# Patient Record
Sex: MALE | Race: BLACK OR AFRICAN AMERICAN | NOT HISPANIC OR LATINO | Employment: OTHER | URBAN - METROPOLITAN AREA
[De-identification: names, ages, dates, MRNs, and addresses within clinical notes are randomized per-mention and may not be internally consistent; named-entity substitution may affect disease eponyms.]

---

## 2018-11-20 ENCOUNTER — TELEPHONE (OUTPATIENT)
Dept: SPINE | Facility: CLINIC | Age: 78
End: 2018-11-20

## 2018-11-20 NOTE — TELEPHONE ENCOUNTER
Spoke with patient.  Had fusion of his neck done in June of this year by Dr Jackson.  Patient c/o neck pain that radiates down his left arm.  Is this ok to schedule with Dr Joe?

## 2018-11-20 NOTE — TELEPHONE ENCOUNTER
----- Message from Refugio Alfaro sent at 11/19/2018  4:19 PM CST -----  Contact: Patient @ [phone]  Patient is calling to schedule the NP appt, pt is being referred by Dr Jackson, pls call to advise

## 2018-11-21 NOTE — TELEPHONE ENCOUNTER
Spoke with patient.  He was referred to see with Dr Salas of [place] for evaluation.  He was scheduled to see Dr Handy, but appt was canceled by Deysi Erwin.  Patient was informed by Deysi Erwin to see Dr Joe.  Patient has follow up with his surgeon Dr Jackson a month ago.  Please advise.

## 2018-11-27 ENCOUNTER — TELEPHONE (OUTPATIENT)
Dept: SPINE | Facility: CLINIC | Age: 78
End: 2018-11-27

## 2018-11-27 NOTE — TELEPHONE ENCOUNTER
----- Message from Shirley Sanchez RN sent at 11/27/2018  2:45 PM CST -----  Please call and advise his appt has been rescheduled to 1/29/2019 at 130 pm as Dr. Joe will not be available on 12/18.

## 2018-11-27 NOTE — TELEPHONE ENCOUNTER
Message left on voicemail and informed patient of new appointment date and time.  Also mailed confirmation to patient.

## 2018-12-21 ENCOUNTER — TELEPHONE (OUTPATIENT)
Dept: SPINE | Facility: CLINIC | Age: 78
End: 2018-12-21

## 2018-12-21 NOTE — TELEPHONE ENCOUNTER
----- Message from Shirley Sanchez RN sent at 12/21/2018  9:41 AM CST -----  Please call patient again regarding rescheduled appt[place]

## 2019-01-16 ENCOUNTER — TELEPHONE (OUTPATIENT)
Dept: SPINE | Facility: CLINIC | Age: 79
End: 2019-01-16

## 2019-01-16 NOTE — TELEPHONE ENCOUNTER
----- Message from Shirley Sanchez RN sent at 1/16/2019 10:27 AM CST -----  Please advise he has a new appt on 2/7

## 2019-02-07 ENCOUNTER — OFFICE VISIT (OUTPATIENT)
Dept: SPINE | Facility: CLINIC | Age: 79
End: 2019-02-07
Attending: NEUROLOGICAL SURGERY
Payer: MEDICARE

## 2019-02-07 VITALS
BODY MASS INDEX: 22.33 KG/M2 | HEART RATE: 80 BPM | TEMPERATURE: 99 F | HEIGHT: 74 IN | WEIGHT: 174 LBS | DIASTOLIC BLOOD PRESSURE: 76 MMHG | SYSTOLIC BLOOD PRESSURE: 112 MMHG

## 2019-02-07 DIAGNOSIS — G52.8 SPINAL ACCESSORY NERVE DISORDER: ICD-10-CM

## 2019-02-07 DIAGNOSIS — M95.8 WINGING OF SCAPULA: Primary | ICD-10-CM

## 2019-02-07 PROCEDURE — 99999 PR PBB SHADOW E&M-EST. PATIENT-LVL III: ICD-10-PCS | Mod: PBBFAC,,, | Performed by: NEUROLOGICAL SURGERY

## 2019-02-07 PROCEDURE — 99999 PR PBB SHADOW E&M-EST. PATIENT-LVL III: CPT | Mod: PBBFAC,,, | Performed by: NEUROLOGICAL SURGERY

## 2019-02-07 PROCEDURE — 99213 OFFICE O/P EST LOW 20 MIN: CPT | Mod: PBBFAC | Performed by: NEUROLOGICAL SURGERY

## 2019-02-07 PROCEDURE — 99204 PR OFFICE/OUTPT VISIT, NEW, LEVL IV, 45-59 MIN: ICD-10-PCS | Mod: S$PBB,,, | Performed by: NEUROLOGICAL SURGERY

## 2019-02-07 PROCEDURE — 99204 OFFICE O/P NEW MOD 45 MIN: CPT | Mod: S$PBB,,, | Performed by: NEUROLOGICAL SURGERY

## 2019-02-07 RX ORDER — HYDROCODONE BITARTRATE AND ACETAMINOPHEN 5; 325 MG/1; MG/1
1 TABLET ORAL
Refills: 0 | COMMUNITY
Start: 2018-12-29 | End: 2023-03-23 | Stop reason: ALTCHOICE

## 2019-02-07 RX ORDER — GABAPENTIN 300 MG/1
300 CAPSULE ORAL DAILY
Refills: 5 | COMMUNITY
End: 2024-01-05 | Stop reason: DRUGHIGH

## 2019-02-07 NOTE — LETTER
February 7, 2019      Suha Jackson MD  [address] [place] [id number]           [facility]  [address], [address]  [facility] [address]  Phone: [phone]  Fax: [phone]          Patient: Hernan Echavarria   MR Number: [id number]   YOB: 1940   Date of Visit: 2/7/2019       Dear Dr. Suha Jackson:    Thank you for referring Hernan Echavarria to me for evaluation. Attached you will find relevant portions of my assessment and plan of care.    If you have questions, please do not hesitate to call me. I look forward to following Hernan Echavarria along with you.    Sincerely,    Matthew Joe MD    Enclosure  CC:  No Recipients    If you would like to receive this communication electronically, please contact [email or web address] or [phone] to request more information on [facility] access.    For providers and/or their staff who would like to refer a patient to [facility], please contact us through our one-stop-shop provider referral line, [facility], at [phone].    If you feel you have received this communication in error or would no longer like to receive these types of communications, please e-mail [email or web address]

## 2019-02-07 NOTE — PROGRESS NOTES
CHIEF COMPLAINT:    Left winging scapula s/p ACDF    HPI:  Hernan Echavarria is a 78 y.o. male who presents for neurosurgical evaluation for nerve injury.  He is s/p ACDF C3-4 and C6-7 that was performed by Dr Suha Jackson which went well but he woke up with left winging scapula and droopy left shoulder. He also has pain that radiate towards the occipital region along the superior half of SCM. He denies any serious neck or radicular arm pain.  He  denies clumsiness, denies incoordination, and denies changes in fine motor skills in the hands/fingers.        (Not in a hospital admission)    Review of patient's allergies indicates:   Allergen Reactions    Hydrocodone Itching       Past Medical History:   Diagnosis Date    Anticoagulant long-term use     Arthritis     Atrial flutter     Cervical disc disease     Coronary artery disease     GERD (gastroesophageal reflux disease)     Heartburn     Hyperlipidemia     Hypertension     Osteoarthritis     S/P AVR (aortic valve replacement) 10/15/2003    CARBOMEDICS R500 &F700    Valvular disease      Past Surgical History:   Procedure Laterality Date    BACK SURGERY      lower back    CARDIAC SURGERY      MECHANICAL HEART VALVE    CARPAL TUNNEL RELEASE      DISKECTOMY AND FUSION-ANTERIOR CERVICAL (ACDF) C4-5 C5-6 N/A 2015    [facility]    EYE SURGERY      CATARACT LENS IMPLANTS    HERNIA REPAIR       Family History   Problem Relation Age of Onset    Hypertension Other     Heart disease Other     Peripheral vascular disease Mother      Social History     Tobacco Use    Smoking status: Former Smoker     Last attempt to quit: 1965     Years since quittin.2    Smokeless tobacco: Never Used   Substance Use Topics    Alcohol use: No    Drug use: No        Review of Systems:  Review of Systems    OBJECTIVE:     Vital Signs (Most Recent)  Temp: 98.5 °F (36.9 °C) (19 1229)  Pulse: 80 (19 1229)  BP:  112/76 (02/07/19 1229)    Physical Exam:    Neurosurgery Physical Exam        Upper Extremity  supraspin infraspin Lat. Dorsi Deltoid Biceps Triceps Brachio  rad FCR FCU Wrist Ext    R 5/5 5/5 5/5 5/5 5/5 5/5 5/5 5/5 5/5 5/5    L 5/5 5/5 5/5 5/5 5/5 5/5 5/5 5/5 5/5 5/5     Abd. Poll. Brevis Palmar Interossei Dorsal Interossei lumbricals Opp. Policis Opponens Dig. Min Finger Extensors       R 5/5 5/5 5/5 5/5 5/5 5/5 5/5       L 5/5 5/5 5/5 5/5 5/5 5/5 5/5          Lower Extremity  Hip Adductor Hip Abductor Hip extension Hip Flexion Quadriceps  (Knee ext) Hamstrings  (Knee Flexion)  Ankle Dorsiflexion Ankle Eversion Ankle Inversion Ankle Plantar flexion Toe  Flexion Toe   Extension    R 5/5 5/5 5/5 5/5 5/5 5/5 5/5 5/5 5/5 5/5 5/5 5/5    L 5/5 5/5 5/5 5/5 5/5 5/5 5/5 5/5 5/5 5/5 5/5 5/5         Dermatome  Axillary Radial Median  Forearm Median  Carpal Tunnel Ulnar-Cubital tunnel Ulnar- Guyon's  canal MAC of the forearm LAC of  the forerm   Upper: R Normal Normal Normal Normal Normal Normal Normal Normal    L Normal Normal Normal Normal Normal Normal Normal Normal     Dermatome  LFCN Obturator  PCNT Peroneal Sural Lat Plantar Nerve Med Plantar Nerve Saphenous Nerve Calcaneal Nerve   Lower: R Normal Normal Normal Normal Normal Normal Normal Normal Normal    L Normal Normal Normal Normal Normal Normal Normal Normal Normal     Left trapezium is 4/5    Laboratory  EMG- left spinal accessory neuropathy, other entrapments involing the median nerve in the wrist    Diagnostic Results:  C-spine: C3-4 and C6-7 ACDF, previous fusion at C5-6 with good placement of hardware    ASSESSMENT/PLAN:     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 No surgery anticipated.

## 2023-01-17 PROBLEM — Z79.01 LONG TERM (CURRENT) USE OF ANTICOAGULANTS: Status: ACTIVE | Noted: 2023-01-17

## 2023-03-23 ENCOUNTER — TELEPHONE (OUTPATIENT)
Dept: GASTROENTEROLOGY | Facility: CLINIC | Age: 83
End: 2023-03-23
Payer: MEDICARE

## 2023-03-23 NOTE — TELEPHONE ENCOUNTER
----- Message from Sally Rai sent at 3/23/2023  9:20 AM CDT -----  Regarding: Sooner appt if possible  Pt is looking to schedule sooner appt if possible.    Currently scheduled for 4/21 @ 1:30 and on the wait list.    Please contact pt to discuss.

## 2023-03-27 PROBLEM — N18.32 STAGE 3B CHRONIC KIDNEY DISEASE: Status: ACTIVE | Noted: 2023-03-27

## 2023-05-04 PROBLEM — K59.01 SLOW TRANSIT CONSTIPATION: Status: ACTIVE | Noted: 2023-05-04

## 2023-05-04 PROBLEM — K21.9 GASTROESOPHAGEAL REFLUX DISEASE: Status: ACTIVE | Noted: 2023-05-04

## 2023-05-04 PROBLEM — K31.84 GASTROPARESIS: Status: ACTIVE | Noted: 2023-05-04

## 2023-07-05 ENCOUNTER — TELEPHONE (OUTPATIENT)
Dept: NEPHROLOGY | Facility: CLINIC | Age: 83
End: 2023-07-05
Payer: MEDICARE

## 2023-07-05 NOTE — TELEPHONE ENCOUNTER
Spoke to pt. He is aware of appt. Date and time and no labs needed at this time as we have not seen pt. In our clinic,  he is a new pt.

## 2023-07-12 PROBLEM — G56.00: Status: ACTIVE | Noted: 2019-09-15

## 2023-07-12 PROBLEM — D64.9 ANEMIA: Status: ACTIVE | Noted: 2019-09-15

## 2023-07-12 PROBLEM — E78.5 HYPERLIPIDEMIA: Status: ACTIVE | Noted: 2019-09-16

## 2023-07-12 PROBLEM — N52.9 IMPOTENCE: Status: ACTIVE | Noted: 2019-09-15

## 2023-07-12 PROBLEM — S06.9XAA BRAIN INJURY: Status: ACTIVE | Noted: 2018-09-10

## 2023-07-12 PROBLEM — G62.9 NEUROPATHY: Status: ACTIVE | Noted: 2021-05-04

## 2023-07-12 PROBLEM — I63.9 CEREBRAL INFARCTION: Status: ACTIVE | Noted: 2018-09-10

## 2023-07-12 PROBLEM — N18.9 CHRONIC KIDNEY DISEASE: Status: ACTIVE | Noted: 2017-09-05

## 2023-07-12 PROBLEM — E21.3 HYPERPARATHYROIDISM: Status: ACTIVE | Noted: 2023-07-12

## 2023-07-12 PROBLEM — K22.70 BARRETT'S ESOPHAGUS: Status: ACTIVE | Noted: 2019-09-15

## 2023-07-12 PROBLEM — I77.810 THORACIC AORTIC ECTASIA: Status: ACTIVE | Noted: 2019-09-15

## 2023-07-12 PROBLEM — I50.42 CHRONIC COMBINED SYSTOLIC AND DIASTOLIC HEART FAILURE: Status: ACTIVE | Noted: 2023-03-20

## 2023-08-02 ENCOUNTER — OFFICE VISIT (OUTPATIENT)
Dept: NEPHROLOGY | Facility: CLINIC | Age: 83
End: 2023-08-02
Payer: MEDICARE

## 2023-08-02 VITALS
HEART RATE: 71 BPM | WEIGHT: 171 LBS | HEIGHT: 74 IN | DIASTOLIC BLOOD PRESSURE: 72 MMHG | SYSTOLIC BLOOD PRESSURE: 122 MMHG | OXYGEN SATURATION: 99 % | BODY MASS INDEX: 21.94 KG/M2

## 2023-08-02 DIAGNOSIS — N18.32 CHRONIC KIDNEY DISEASE, STAGE 3B: Primary | ICD-10-CM

## 2023-08-02 PROCEDURE — 99999 PR PBB SHADOW E&M-EST. PATIENT-LVL IV: ICD-10-PCS | Mod: PBBFAC,,, | Performed by: INTERNAL MEDICINE

## 2023-08-02 PROCEDURE — 3074F PR MOST RECENT SYSTOLIC BLOOD PRESSURE < 130 MM HG: ICD-10-PCS | Mod: CPTII,S$GLB,, | Performed by: INTERNAL MEDICINE

## 2023-08-02 PROCEDURE — 99204 OFFICE O/P NEW MOD 45 MIN: CPT | Mod: S$GLB,,, | Performed by: INTERNAL MEDICINE

## 2023-08-02 PROCEDURE — 1159F MED LIST DOCD IN RCRD: CPT | Mod: CPTII,S$GLB,, | Performed by: INTERNAL MEDICINE

## 2023-08-02 PROCEDURE — 1101F PR PT FALLS ASSESS DOC 0-1 FALLS W/OUT INJ PAST YR: ICD-10-PCS | Mod: CPTII,S$GLB,, | Performed by: INTERNAL MEDICINE

## 2023-08-02 PROCEDURE — 1101F PT FALLS ASSESS-DOCD LE1/YR: CPT | Mod: CPTII,S$GLB,, | Performed by: INTERNAL MEDICINE

## 2023-08-02 PROCEDURE — 1160F PR REVIEW ALL MEDS BY PRESCRIBER/CLIN PHARMACIST DOCUMENTED: ICD-10-PCS | Mod: CPTII,S$GLB,, | Performed by: INTERNAL MEDICINE

## 2023-08-02 PROCEDURE — 3288F FALL RISK ASSESSMENT DOCD: CPT | Mod: CPTII,S$GLB,, | Performed by: INTERNAL MEDICINE

## 2023-08-02 PROCEDURE — 3074F SYST BP LT 130 MM HG: CPT | Mod: CPTII,S$GLB,, | Performed by: INTERNAL MEDICINE

## 2023-08-02 PROCEDURE — 3078F DIAST BP <80 MM HG: CPT | Mod: CPTII,S$GLB,, | Performed by: INTERNAL MEDICINE

## 2023-08-02 PROCEDURE — 99999 PR PBB SHADOW E&M-EST. PATIENT-LVL IV: CPT | Mod: PBBFAC,,, | Performed by: INTERNAL MEDICINE

## 2023-08-02 PROCEDURE — 3288F PR FALLS RISK ASSESSMENT DOCUMENTED: ICD-10-PCS | Mod: CPTII,S$GLB,, | Performed by: INTERNAL MEDICINE

## 2023-08-02 PROCEDURE — 1160F RVW MEDS BY RX/DR IN RCRD: CPT | Mod: CPTII,S$GLB,, | Performed by: INTERNAL MEDICINE

## 2023-08-02 PROCEDURE — 3078F PR MOST RECENT DIASTOLIC BLOOD PRESSURE < 80 MM HG: ICD-10-PCS | Mod: CPTII,S$GLB,, | Performed by: INTERNAL MEDICINE

## 2023-08-02 PROCEDURE — 1159F PR MEDICATION LIST DOCUMENTED IN MEDICAL RECORD: ICD-10-PCS | Mod: CPTII,S$GLB,, | Performed by: INTERNAL MEDICINE

## 2023-08-02 PROCEDURE — 99204 PR OFFICE/OUTPT VISIT, NEW, LEVL IV, 45-59 MIN: ICD-10-PCS | Mod: S$GLB,,, | Performed by: INTERNAL MEDICINE

## 2023-08-02 NOTE — PROGRESS NOTES
Subjective:       Patient ID: Hernan Echavarria is a 82 y.o. Black or  male who presents for new patient evaluation for chronic renal failure.    Hernan Echavarria is referred by Lesley Hernandez DO to be evaluated for chronic renal failure.  He has been seeing another kidney doctor but comes in today for a second opinion.  He reports he has itchy skin chronically.  He also reports he was started on farxiga but does not want to continue it due to cost.  He has been having abdominal bloating for the past 4-5 months and was seen by GI but he comes in today asking for a second opinion regarding this as well.  He denies frequent NSAIDs.  He had a recent CT scan which did not show any masses but did show a prominent stool pattern.      Review of Systems   Constitutional:  Negative for appetite change, chills and fever.   HENT:  Negative for congestion.    Eyes:  Negative for visual disturbance.   Respiratory:  Negative for cough and shortness of breath.    Cardiovascular:  Negative for chest pain and leg swelling.   Gastrointestinal:  Positive for abdominal distention. Negative for abdominal pain, diarrhea, nausea and vomiting.   Genitourinary:  Negative for difficulty urinating, dysuria and hematuria.   Musculoskeletal:  Negative for myalgias.   Skin:  Negative for rash.        itchy   Neurological:  Negative for headaches.   Psychiatric/Behavioral:  Negative for sleep disturbance.          The past medical, family and social histories were reviewed for this encounter.     Past Medical History:   Diagnosis Date    Anticoagulant long-term use     Arthritis     Atrial flutter     Cervical disc disease     Coronary artery disease     GERD (gastroesophageal reflux disease)     Heartburn     Hyperlipidemia     Hypertension     Osteoarthritis     S/P AVR (aortic valve replacement) 10/15/2003    CARBOMEDICS R500 &F700    Valvular disease      Past Surgical History:   Procedure Laterality Date    BACK SURGERY       lower back    CARDIAC SURGERY      MECHANICAL HEART VALVE    CARPAL TUNNEL RELEASE      EYE SURGERY      CATARACT LENS IMPLANTS    HERNIA REPAIR       Social History     Socioeconomic History    Marital status:    Tobacco Use    Smoking status: Former     Current packs/day: 0.00     Types: Cigarettes     Quit date: 1965     Years since quittin.7    Smokeless tobacco: Never   Substance and Sexual Activity    Alcohol use: No    Drug use: No     Current Outpatient Medications   Medication Sig    aspirin 81 MG Chew Take 81 mg by mouth once daily.    atenolol (TENORMIN) 50 MG tablet Take 50 mg by mouth every morning.    Bifidobacterium infantis (ALIGN) 4 mg Cap Take by mouth.    diclofenac sodium (VOLTAREN) 1 % Gel Apply 2 g topically 3 (three) times daily. for 14 days    docusate sodium (COLACE) 100 MG capsule Take 1 capsule (100 mg total) by mouth 2 (two) times daily as needed for Constipation.    famotidine (PEPCID) 20 MG tablet Take 1 tablet (20 mg total) by mouth 2 (two) times daily.    gabapentin (NEURONTIN) 300 MG capsule Take 300 mg by mouth once daily.    hydrOXYzine HCL (ATARAX) 25 MG tablet Take 1 tablet by mouth nightly as needed for Itching.    levocetirizine (XYZAL) 5 MG tablet Take 1 tablet (5 mg total) by mouth every evening.    metoclopramide HCl (REGLAN) 10 MG tablet Take 1 tablet (10 mg total) by mouth 3 (three) times daily as needed (abdominal bloating/cramps). Abdominal bloating / cramps    mupirocin (BACTROBAN) 2 % ointment Apply topically 3 (three) times daily.    pantoprazole (PROTONIX) 40 MG tablet Take 40 mg by mouth once daily.    rosuvastatin (CRESTOR) 10 MG tablet Take 10 mg by mouth every evening.    tamsulosin (FLOMAX) 0.4 mg Cap Take 1 capsule by mouth Daily.    warfarin (COUMADIN) 5 MG tablet Take 2.5-5 mg by mouth once daily. Take 1/2-1 pill by mouth daily or as directed per the [facility]; Current dose per patient (23): Warfarin 5mg daily, except 2.5mg on  "Tuesdays and Thursdays     No current facility-administered medications for this visit.       /72 (BP Location: Left arm, Patient Position: Sitting)   Pulse 71   Ht 6' 2" (1.88 m)   Wt 77.6 kg (171 lb)   SpO2 99%   BMI 21.96 kg/m²     Objective:      Physical Exam  Vitals reviewed.   Constitutional:       General: He is not in acute distress.     Appearance: He is well-developed.   HENT:      Head: Normocephalic and atraumatic.   Eyes:      General: No scleral icterus.     Conjunctiva/sclera: Conjunctivae normal.   Neck:      Vascular: No JVD.   Cardiovascular:      Rate and Rhythm: Normal rate and regular rhythm.      Heart sounds: Normal heart sounds. No murmur heard.     No friction rub. No gallop.   Pulmonary:      Effort: Pulmonary effort is normal. No respiratory distress.      Breath sounds: Normal breath sounds. No wheezing.   Abdominal:      General: Bowel sounds are normal. There is no distension.      Palpations: Abdomen is soft.      Tenderness: There is no abdominal tenderness.   Musculoskeletal:      Cervical back: Normal range of motion.      Right lower leg: No edema.      Left lower leg: No edema.   Skin:     General: Skin is warm and dry.      Findings: No rash.   Neurological:      Mental Status: He is alert and oriented to person, place, and time.   Psychiatric:         Mood and Affect: Mood normal.         Behavior: Behavior normal.         Assessment:       1. Chronic kidney disease, stage 3b        Plan:   Return to clinic in 4 months.  Labs for next visit include rp pth upc per standing orders.  Baseline creatinine is 1.4-1.7 since 2015.  Renal US shows R 10.4 cm L 10.8 cm.  Refer to GI per his request.      "

## 2023-08-04 ENCOUNTER — TELEPHONE (OUTPATIENT)
Dept: NEPHROLOGY | Facility: CLINIC | Age: 83
End: 2023-08-04
Payer: MEDICARE

## 2023-08-04 NOTE — TELEPHONE ENCOUNTER
----- Message from Monica Espinoza sent at 8/4/2023 12:48 PM CDT -----  Contact: call  Type:  Needs Medical Advice    Who Called: patient     Would the patient rather a call back or a response via [facility]? Call     Best Call Back Number: [phone] (home)      Additional Information: Patient would like to speak with the nurse in regards to discussing something.     Please call to advise

## 2023-08-08 ENCOUNTER — TELEPHONE (OUTPATIENT)
Dept: GASTROENTEROLOGY | Facility: CLINIC | Age: 83
End: 2023-08-08

## 2023-08-08 ENCOUNTER — OFFICE VISIT (OUTPATIENT)
Dept: GASTROENTEROLOGY | Facility: CLINIC | Age: 83
End: 2023-08-08
Payer: MEDICARE

## 2023-08-08 ENCOUNTER — TREATMENT PLANNING (OUTPATIENT)
Dept: GASTROENTEROLOGY | Facility: CLINIC | Age: 83
End: 2023-08-08

## 2023-08-08 VITALS — BODY MASS INDEX: 22.75 KG/M2 | WEIGHT: 177.25 LBS | HEIGHT: 74 IN

## 2023-08-08 DIAGNOSIS — R14.0 ABDOMINAL BLOATING: Primary | ICD-10-CM

## 2023-08-08 DIAGNOSIS — Z87.19 HISTORY OF GASTROESOPHAGEAL REFLUX (GERD): ICD-10-CM

## 2023-08-08 DIAGNOSIS — K31.84 GASTROPARESIS: ICD-10-CM

## 2023-08-08 DIAGNOSIS — K59.04 CHRONIC IDIOPATHIC CONSTIPATION: ICD-10-CM

## 2023-08-08 DIAGNOSIS — Z79.01 CURRENT USE OF ANTICOAGULANT THERAPY: ICD-10-CM

## 2023-08-08 DIAGNOSIS — R10.84 GENERALIZED ABDOMINAL PAIN: ICD-10-CM

## 2023-08-08 PROCEDURE — 99214 PR OFFICE/OUTPT VISIT, EST, LEVL IV, 30-39 MIN: ICD-10-PCS | Mod: S$GLB,,, | Performed by: NURSE PRACTITIONER

## 2023-08-08 PROCEDURE — 1160F RVW MEDS BY RX/DR IN RCRD: CPT | Mod: CPTII,S$GLB,, | Performed by: NURSE PRACTITIONER

## 2023-08-08 PROCEDURE — 99999 PR PBB SHADOW E&M-EST. PATIENT-LVL IV: CPT | Mod: PBBFAC,,, | Performed by: NURSE PRACTITIONER

## 2023-08-08 PROCEDURE — 1159F MED LIST DOCD IN RCRD: CPT | Mod: CPTII,S$GLB,, | Performed by: NURSE PRACTITIONER

## 2023-08-08 PROCEDURE — 1159F PR MEDICATION LIST DOCUMENTED IN MEDICAL RECORD: ICD-10-PCS | Mod: CPTII,S$GLB,, | Performed by: NURSE PRACTITIONER

## 2023-08-08 PROCEDURE — 99214 OFFICE O/P EST MOD 30 MIN: CPT | Mod: S$GLB,,, | Performed by: NURSE PRACTITIONER

## 2023-08-08 PROCEDURE — 1160F PR REVIEW ALL MEDS BY PRESCRIBER/CLIN PHARMACIST DOCUMENTED: ICD-10-PCS | Mod: CPTII,S$GLB,, | Performed by: NURSE PRACTITIONER

## 2023-08-08 PROCEDURE — 99999 PR PBB SHADOW E&M-EST. PATIENT-LVL IV: ICD-10-PCS | Mod: PBBFAC,,, | Performed by: NURSE PRACTITIONER

## 2023-08-08 RX ORDER — POLYETHYLENE GLYCOL 3350 17 G/17G
17 POWDER, FOR SOLUTION ORAL DAILY
Qty: 510 G | Refills: 1 | Status: SHIPPED | OUTPATIENT
Start: 2023-08-08 | End: 2023-10-30

## 2023-08-08 NOTE — TELEPHONE ENCOUNTER
Patient is scheduled for a colonoscopy on 9/29 with Dr. Torrez. Can you please advised him on Coumadin prior to procedure? Thank you!

## 2023-08-08 NOTE — PROGRESS NOTES
Subjective:       Patient ID: Hernan Echavarria is a 82 y.o. male, Body mass index is 22.76 kg/m².    Chief Complaint: Bloated      Patient is new to me. Referred by Dr. Overton for abdominal bloating.  Former patient of Dr. Gandhi.    Abdominal Pain  This is a chronic problem. The current episode started more than 1 month ago (Started ~ 5-6 months ago). The onset quality is sudden. The problem occurs intermittently. The problem has been unchanged. The pain is located in the generalized abdominal region. The quality of the pain is a sensation of fullness and aching (describes as bloating). The abdominal pain does not radiate. Associated symptoms include constipation (bowel movements are 1-2 times per day; reports a feeling of incomplete emptying; taking Colace 100 mg BID) and nausea (Hx of gastroparesis; tried Reglan but he stopped taking because it caused fatigue). Pertinent negatives include no anorexia, belching, diarrhea, dysuria, fever, flatus, frequency, hematochezia, melena, vomiting or weight loss. The pain is aggravated by palpation and eating. The pain is relieved by Nothing. He has tried proton pump inhibitors and H2 blockers for the symptoms. The treatment provided no relief. Prior diagnostic workup includes GI consult, upper endoscopy and CT scan. His past medical history is significant for abdominal surgery, gallstones (Hx of cholecystectomy) and GERD (Hx of GERD- well controlled taking Protonix 40 mg once daily and Famotidine 20 mg BID). There is no history of colon cancer, Crohn's disease, irritable bowel syndrome, pancreatitis, PUD or ulcerative colitis.     Review of Systems   Constitutional:  Negative for appetite change, fever, unexpected weight change and weight loss.   HENT:  Negative for trouble swallowing.    Respiratory:  Negative for cough and shortness of breath.    Cardiovascular:  Negative for chest pain.   Gastrointestinal:  Positive for abdominal distention, abdominal pain, constipation  (bowel movements are 1-2 times per day; reports a feeling of incomplete emptying; taking Colace 100 mg BID) and nausea (Hx of gastroparesis; tried Reglan but he stopped taking because it caused fatigue). Negative for anal bleeding, anorexia, blood in stool, diarrhea, flatus, hematochezia, melena, rectal pain and vomiting.   Genitourinary:  Negative for difficulty urinating, dysuria and frequency.   Musculoskeletal:  Negative for gait problem.   Skin:  Negative for rash.   Neurological:  Negative for speech difficulty.   Psychiatric/Behavioral:  Negative for confusion.        Past Medical History:   Diagnosis Date    Anticoagulant long-term use     Arthritis     Atrial flutter     Cervical disc disease     Coronary artery disease     GERD (gastroesophageal reflux disease)     Heartburn     Hyperlipidemia     Hypertension     Osteoarthritis     S/P AVR (aortic valve replacement) 10/15/2003    CARBOMEDICS R500 &F700    Valvular disease       Past Surgical History:   Procedure Laterality Date    BACK SURGERY      lower back    CARDIAC SURGERY      MECHANICAL HEART VALVE    CARPAL TUNNEL RELEASE      CHOLECYSTECTOMY      COLONOSCOPY      EYE SURGERY      CATARACT LENS IMPLANTS    HERNIA REPAIR      UPPER GASTROINTESTINAL ENDOSCOPY        Family History   Problem Relation Age of Onset    Hypertension Other     Heart disease Other     Peripheral vascular disease Mother       Wt Readings from Last 10 Encounters:   08/08/23 80.4 kg (177 lb 4 oz)   08/02/23 77.6 kg (171 lb)   07/12/23 78 kg (171 lb 14.4 oz)   05/23/23 79.1 kg (174 lb 6.4 oz)   05/19/23 80.7 kg (178 lb)   05/04/23 77.7 kg (171 lb 4.8 oz)   04/14/23 78.9 kg (174 lb)   03/27/23 78.2 kg (172 lb 4.8 oz)   03/23/23 79.3 kg (174 lb 12.8 oz)   01/04/23 79 kg (174 lb 1.6 oz)     Lab Results   Component Value Date    WBC 5.15 05/24/2023    HGB 12.9 (L) 05/24/2023    HCT 40.0 05/24/2023     (H) 05/24/2023     05/24/2023     CMP  Sodium   Date Value Ref  Range Status   06/07/2023 139 136 - 145 mmol/L Final     Potassium   Date Value Ref Range Status   06/07/2023 4.8 3.5 - 5.1 mmol/L Final     Comment:     Anion Gap reference range revised on 4/28/2023     Chloride   Date Value Ref Range Status   06/07/2023 105 95 - 110 mmol/L Final     CO2   Date Value Ref Range Status   06/07/2023 27 22 - 31 mmol/L Final     Glucose   Date Value Ref Range Status   06/07/2023 108 70 - 110 mg/dL Final     Comment:     The ADA recommends the following guidelines for fasting glucose:    Normal:       less than 100 mg/dL    Prediabetes:  100 mg/dL to 125 mg/dL    Diabetes:     126 mg/dL or higher       BUN   Date Value Ref Range Status   06/07/2023 20 9 - 21 mg/dL Final     Creatinine   Date Value Ref Range Status   06/07/2023 1.64 (H) 0.50 - 1.40 mg/dL Final     Calcium   Date Value Ref Range Status   06/07/2023 9.1 8.4 - 10.2 mg/dL Final     Total Protein   Date Value Ref Range Status   05/24/2023 7.5 6.0 - 8.4 g/dL Final     Albumin   Date Value Ref Range Status   06/07/2023 4.5 3.5 - 5.2 g/dL Final     Total Bilirubin   Date Value Ref Range Status   05/24/2023 1.6 (H) 0.2 - 1.3 mg/dL Final     Alkaline Phosphatase   Date Value Ref Range Status   05/24/2023 68 38 - 145 U/L Final     AST ([place])   Date Value Ref Range Status   11/10/2015 95 (H) 17 - 59 U/L Final     AST   Date Value Ref Range Status   05/24/2023 45 17 - 59 U/L Final     ALT   Date Value Ref Range Status   05/24/2023 30 0 - 50 U/L Final     Anion Gap   Date Value Ref Range Status   06/07/2023 7 mmol/L Final     Comment:     Anion Gap reference range revised on 4/28/2023     eGFR if    Date Value Ref Range Status   06/13/2022 39 (A) >60 mL/min/1.73 m^2 Final     eGFR if non    Date Value Ref Range Status   06/13/2022 34 (A) >60 mL/min/1.73 m^2 Final     Comment:     Calculation used to obtain the estimated glomerular filtration  rate (eGFR) is the CKD-EPI equation.        Lab  Results   Component Value Date    AMYLASE 82 01/26/2023     Lab Results   Component Value Date    LIPASE 49 07/21/2016     Lab Results   Component Value Date    LIPASERES 81 01/26/2023             Reviewed prior medical records including radiology report of CT of abdomen and pelvis 7/25/23 & endoscopy history (see surgical history).     Objective:      Physical Exam  Constitutional:       General: He is not in acute distress.     Appearance: He is well-developed.   HENT:      Head: Normocephalic.      Right Ear: Hearing normal.      Left Ear: Hearing normal.      Nose: Nose normal.      Mouth/Throat:      Mouth: No oral lesions.      Pharynx: Uvula midline.   Eyes:      General: Lids are normal.      Conjunctiva/sclera: Conjunctivae normal.      Pupils: Pupils are equal, round, and reactive to light.   Neck:      Trachea: Trachea normal.   Cardiovascular:      Rate and Rhythm: Normal rate and regular rhythm.      Heart sounds: Normal heart sounds. No murmur heard.  Pulmonary:      Effort: Pulmonary effort is normal. No respiratory distress.      Breath sounds: Normal breath sounds. No stridor. No wheezing.   Abdominal:      General: Bowel sounds are normal. There is no distension.      Palpations: Abdomen is soft. There is no mass.      Tenderness: There is abdominal tenderness (mild) in the left lower quadrant. There is no guarding or rebound.      Comments: Well healed surgical scars noted   Musculoskeletal:         General: Normal range of motion.      Cervical back: Normal range of motion.   Skin:     General: Skin is warm and dry.      Findings: No rash.      Comments: Non jaundiced   Neurological:      Mental Status: He is alert and oriented to person, place, and time.   Psychiatric:         Speech: Speech normal.         Behavior: Behavior normal. Behavior is cooperative.           Assessment:       1. Abdominal bloating    2. Generalized abdominal pain    3. Chronic idiopathic constipation    4. History of  gastroesophageal reflux (GERD)    5. History of gastroparesis    6. Current use of anticoagulant therapy           Plan:   Patient agreed to sign medical release form to obtain records from Dr. Gandhi's office.     All diagnoses and orders for this visit:    Abdominal bloating & Generalized abdominal pain   - Recommended OTC simethicone as directed, such as Phazyme or Gas-x  - Discussed with patient about low gas diet: Reduce or eliminate these foods from your diet: Broccoli, Cauliflower, Blair sprouts, Cabbage, Cooked dried beans, Carbonated beverages (sparkling water, soda, beer, champagne)  - Start: polyethylene glycol (GLYCOLAX) 17 gram/dose powder; Take 17 g by mouth once daily.  Dispense: 510 g; Refill: 1  - Schedule Colonoscopy   - Consider trial of Linzess if symptoms persist    Chronic idiopathic constipation  - Start: polyethylene glycol (GLYCOLAX) 17 gram/dose powder; Take 17 g by mouth once daily.  Dispense: 510 g; Refill: 1  - Continue Colace 100 mg BID  - Recommend daily exercise as tolerated, adequate water intake, and high fiber diet.   - Schedule Colonoscopy   - Consider trial of Linzess if symptoms persist     History of gastroesophageal reflux (GERD)   - Continue Protonix 40 mg once daily   - Continue Famotidine 20 mg BID   - Take PPI in the morning 30 minutes before breakfast  - Recommend to avoid large meals, avoid eating within 3 hours of bedtime, elevate head of bed if nocturnal symptoms are present, smoking cessation (if current smoker), & weight loss (if overweight).   - Recommend minimize/avoid high-fat foods, chocolate, caffeine, citrus, alcohol, & tomato products.  - Advised to avoid/limit use of NSAID's, since they can cause GI upset, bleeding, and/or ulcers. If needed, take with food.      History of gastroparesis  - Recommend small frequent meals instead of 3 big meals a day, low fat meals & low residue diet.  - Avoid: high fiber (insoluble fiber), red meats, dairy, and  non-digestible solids (peels, fruit pulp, etc). Avoid NSAIDs and narcotics.    Current use of anticoagulant therapy   - Informed patient that the anticoagulant(s) will likely need to be held for endoscopy, nurse will confirm with cardiologist/PCP.     Recommend follow-up with GI physician in 4-6 weeks for continued evaluation and management.  If no improvement in symptoms or symptoms worsen, call/follow-up at clinic or go to ER.

## 2023-08-08 NOTE — PROGRESS NOTES
EGD and colonoscopy reports received from [facility]. Medical and surgical history updated. Records sent for scanning.

## 2023-08-08 NOTE — TELEPHONE ENCOUNTER
Let patient know I ordered a stool for H Pylori since biopsies were not taken during his recent EGD. He will need to stop his reflux medications (Protonix and Pepcid) for two weeks before collecting sample but can start back on them after collecting sample.

## 2023-08-08 NOTE — TELEPHONE ENCOUNTER
Called and notified pt of recommendations per Sharee Agarwal NP. Pt verbalized understanding to all

## 2023-09-14 ENCOUNTER — TELEPHONE (OUTPATIENT)
Dept: GASTROENTEROLOGY | Facility: CLINIC | Age: 83
End: 2023-09-14
Payer: MEDICARE

## 2023-09-14 NOTE — TELEPHONE ENCOUNTER
Called and left a message for Krishna that we do not have a clearance form such as a surgery form and will accept a prescription note with the approval to hold blood thinner for procedure and signed by MD.

## 2023-09-14 NOTE — TELEPHONE ENCOUNTER
Returned call to the patient, patient notified of the dates taht he is scheduled an no sooner availability found, patient verbalized understanding of this.

## 2023-09-14 NOTE — TELEPHONE ENCOUNTER
----- Message from Kari Kay sent at 9/14/2023  3:18 PM CDT -----  Regarding: Clearance form  Type:  Needs Medical Advice    Who Called: Krishna/ [facility]      Would the patient rather a call back or a response via [facility]? Callback     Best Call Back Number: [phone] fax [phone]    Additional Information: Needs a clearance form for pt. Thank you

## 2023-09-14 NOTE — TELEPHONE ENCOUNTER
----- Message from Marci Hayes sent at 9/14/2023  8:03 AM CDT -----  Type:  Same Day Appointment Request    Caller is requesting a same day appointment.  Caller declined first available appointment listed below.      Name of Caller:  pt  When is the first available appointment?  10/9 drea laguerre--said he need to be seen today--please call and advise  Symptoms:  stomach issues  Best Call Back Number:  [phone]   Additional Information:   thank you

## 2023-09-15 ENCOUNTER — TELEPHONE (OUTPATIENT)
Dept: NEPHROLOGY | Facility: CLINIC | Age: 83
End: 2023-09-15
Payer: MEDICARE

## 2023-09-15 RX ORDER — LINACLOTIDE 72 UG/1
72 CAPSULE, GELATIN COATED ORAL
COMMUNITY
Start: 2023-08-16 | End: 2023-10-30

## 2023-09-15 RX ORDER — FUROSEMIDE 40 MG/1
40 TABLET ORAL
COMMUNITY
Start: 2023-09-14 | End: 2023-12-05 | Stop reason: ALTCHOICE

## 2023-09-15 NOTE — TELEPHONE ENCOUNTER
----- Message from Sophia Estrada sent at 9/15/2023  8:07 AM CDT -----  Contact: pt [phone]  Type: Needs Medical Advice  Who Called:  Pt    Best Call Back Number: [phone]    Additional Information: Pt stated his cardio dr rx'd him furosemide. Pt calling to ask if this medication is safe for his kidneys. Pt stated he took one pill this morning. Pls call back and advise

## 2023-09-15 NOTE — TELEPHONE ENCOUNTER
[facility] staff  put him on a trial of lasix.  He will follow up on Tuesday.     Labwork on 2nd of October getting US and labs at Dr Sarabia's office.     I moved his nephrology appointment up to October 9 on a day he is going to be in town.      Messaging Dr Sarabia's staff to add our labwork to theirs.

## 2023-09-19 ENCOUNTER — TELEPHONE (OUTPATIENT)
Dept: GASTROENTEROLOGY | Facility: CLINIC | Age: 83
End: 2023-09-19
Payer: MEDICARE

## 2023-09-19 NOTE — TELEPHONE ENCOUNTER
Called and spoke with the patient, patient states that he was told by cardiology that he can not be off his blood thinners at this time, patient requested to cancel the procedure, procedure cancelled per patient request.

## 2023-09-19 NOTE — TELEPHONE ENCOUNTER
Patient had labs at [facility] yesterday. Will need to call for lab results.     Message sent to GI regarding colon per his request.

## 2023-09-25 ENCOUNTER — TELEPHONE (OUTPATIENT)
Dept: GASTROENTEROLOGY | Facility: CLINIC | Age: 83
End: 2023-09-25
Payer: MEDICARE

## 2023-09-25 NOTE — TELEPHONE ENCOUNTER
----- Message from Annabella Trivedi MA sent at 9/25/2023  9:05 AM CDT -----  Type:  Same Day Appointment Request    Caller is requesting a same day appointment.  Caller declined first available appointment listed below.      Name of Caller:  pt  When is the first available appointment?  10/30  Symptoms:  unable to do cscope due to heart condition   Best Call Back Number:  [phone]  Additional Information:   Please advise as pt feels he needs to be seen today for GI problem

## 2023-09-25 NOTE — TELEPHONE ENCOUNTER
Returned call to pt. Pt stating what is my next step since my c-scope was canceled. Advised pt he had a follow-up appointment scheduled with Dr. Torrez on 10/9 and he could discuss his concerns at that visit. Pt verbalized understanding

## 2023-10-09 ENCOUNTER — OFFICE VISIT (OUTPATIENT)
Dept: GASTROENTEROLOGY | Facility: CLINIC | Age: 83
End: 2023-10-09
Payer: MEDICARE

## 2023-10-09 VITALS — BODY MASS INDEX: 21.84 KG/M2 | HEIGHT: 74 IN | WEIGHT: 170.19 LBS

## 2023-10-09 DIAGNOSIS — Z79.01 LONG TERM (CURRENT) USE OF ANTICOAGULANTS: ICD-10-CM

## 2023-10-09 DIAGNOSIS — K31.84 GASTROPARESIS: Primary | ICD-10-CM

## 2023-10-09 DIAGNOSIS — K59.01 SLOW TRANSIT CONSTIPATION: ICD-10-CM

## 2023-10-09 DIAGNOSIS — R14.0 BLOATING: ICD-10-CM

## 2023-10-09 PROCEDURE — 1101F PR PT FALLS ASSESS DOC 0-1 FALLS W/OUT INJ PAST YR: ICD-10-PCS | Mod: CPTII,S$GLB,, | Performed by: INTERNAL MEDICINE

## 2023-10-09 PROCEDURE — 3288F FALL RISK ASSESSMENT DOCD: CPT | Mod: CPTII,S$GLB,, | Performed by: INTERNAL MEDICINE

## 2023-10-09 PROCEDURE — 1101F PT FALLS ASSESS-DOCD LE1/YR: CPT | Mod: CPTII,S$GLB,, | Performed by: INTERNAL MEDICINE

## 2023-10-09 PROCEDURE — 1159F MED LIST DOCD IN RCRD: CPT | Mod: CPTII,S$GLB,, | Performed by: INTERNAL MEDICINE

## 2023-10-09 PROCEDURE — 99999 PR PBB SHADOW E&M-EST. PATIENT-LVL III: ICD-10-PCS | Mod: PBBFAC,,, | Performed by: INTERNAL MEDICINE

## 2023-10-09 PROCEDURE — 3288F PR FALLS RISK ASSESSMENT DOCUMENTED: ICD-10-PCS | Mod: CPTII,S$GLB,, | Performed by: INTERNAL MEDICINE

## 2023-10-09 PROCEDURE — 99999 PR PBB SHADOW E&M-EST. PATIENT-LVL III: CPT | Mod: PBBFAC,,, | Performed by: INTERNAL MEDICINE

## 2023-10-09 PROCEDURE — 1159F PR MEDICATION LIST DOCUMENTED IN MEDICAL RECORD: ICD-10-PCS | Mod: CPTII,S$GLB,, | Performed by: INTERNAL MEDICINE

## 2023-10-09 PROCEDURE — 99214 OFFICE O/P EST MOD 30 MIN: CPT | Mod: S$GLB,,, | Performed by: INTERNAL MEDICINE

## 2023-10-09 PROCEDURE — 99214 PR OFFICE/OUTPT VISIT, EST, LEVL IV, 30-39 MIN: ICD-10-PCS | Mod: S$GLB,,, | Performed by: INTERNAL MEDICINE

## 2023-10-09 RX ORDER — MISOPROSTOL 100 UG/1
100 TABLET ORAL
Qty: 120 TABLET | Refills: 11 | Status: SHIPPED | OUTPATIENT
Start: 2023-10-09 | End: 2023-12-18 | Stop reason: CLARIF

## 2023-10-09 RX ORDER — SPIRONOLACTONE 25 MG/1
1 TABLET ORAL DAILY
COMMUNITY
Start: 2023-09-19 | End: 2023-12-05 | Stop reason: ALTCHOICE

## 2023-10-30 ENCOUNTER — OFFICE VISIT (OUTPATIENT)
Dept: GASTROENTEROLOGY | Facility: CLINIC | Age: 83
End: 2023-10-30
Payer: MEDICARE

## 2023-10-30 VITALS — BODY MASS INDEX: 22.12 KG/M2 | WEIGHT: 172.38 LBS | HEIGHT: 74 IN

## 2023-10-30 DIAGNOSIS — Z79.01 CURRENT USE OF ANTICOAGULANT THERAPY: ICD-10-CM

## 2023-10-30 DIAGNOSIS — K31.84 GASTROPARESIS: ICD-10-CM

## 2023-10-30 DIAGNOSIS — Z87.19 HISTORY OF GASTROESOPHAGEAL REFLUX (GERD): ICD-10-CM

## 2023-10-30 DIAGNOSIS — K59.04 CHRONIC IDIOPATHIC CONSTIPATION: ICD-10-CM

## 2023-10-30 DIAGNOSIS — R14.0 ABDOMINAL BLOATING: Primary | ICD-10-CM

## 2023-10-30 DIAGNOSIS — Z86.010 HISTORY OF COLON POLYPS: ICD-10-CM

## 2023-10-30 DIAGNOSIS — Z90.49 S/P CHOLECYSTECTOMY: ICD-10-CM

## 2023-10-30 PROCEDURE — 1159F MED LIST DOCD IN RCRD: CPT | Mod: CPTII,S$GLB,, | Performed by: NURSE PRACTITIONER

## 2023-10-30 PROCEDURE — 99999 PR PBB SHADOW E&M-EST. PATIENT-LVL III: CPT | Mod: PBBFAC,,, | Performed by: NURSE PRACTITIONER

## 2023-10-30 PROCEDURE — 3288F PR FALLS RISK ASSESSMENT DOCUMENTED: ICD-10-PCS | Mod: CPTII,S$GLB,, | Performed by: NURSE PRACTITIONER

## 2023-10-30 PROCEDURE — 1159F PR MEDICATION LIST DOCUMENTED IN MEDICAL RECORD: ICD-10-PCS | Mod: CPTII,S$GLB,, | Performed by: NURSE PRACTITIONER

## 2023-10-30 PROCEDURE — 1101F PT FALLS ASSESS-DOCD LE1/YR: CPT | Mod: CPTII,S$GLB,, | Performed by: NURSE PRACTITIONER

## 2023-10-30 PROCEDURE — 99214 OFFICE O/P EST MOD 30 MIN: CPT | Mod: S$GLB,,, | Performed by: NURSE PRACTITIONER

## 2023-10-30 PROCEDURE — 1160F RVW MEDS BY RX/DR IN RCRD: CPT | Mod: CPTII,S$GLB,, | Performed by: NURSE PRACTITIONER

## 2023-10-30 PROCEDURE — 3288F FALL RISK ASSESSMENT DOCD: CPT | Mod: CPTII,S$GLB,, | Performed by: NURSE PRACTITIONER

## 2023-10-30 PROCEDURE — 1101F PR PT FALLS ASSESS DOC 0-1 FALLS W/OUT INJ PAST YR: ICD-10-PCS | Mod: CPTII,S$GLB,, | Performed by: NURSE PRACTITIONER

## 2023-10-30 PROCEDURE — 99999 PR PBB SHADOW E&M-EST. PATIENT-LVL III: ICD-10-PCS | Mod: PBBFAC,,, | Performed by: NURSE PRACTITIONER

## 2023-10-30 PROCEDURE — 1160F PR REVIEW ALL MEDS BY PRESCRIBER/CLIN PHARMACIST DOCUMENTED: ICD-10-PCS | Mod: CPTII,S$GLB,, | Performed by: NURSE PRACTITIONER

## 2023-10-30 PROCEDURE — 99214 PR OFFICE/OUTPT VISIT, EST, LEVL IV, 30-39 MIN: ICD-10-PCS | Mod: S$GLB,,, | Performed by: NURSE PRACTITIONER

## 2023-10-30 NOTE — PROGRESS NOTES
Subjective     Patient ID: Hernan Echavarria is a 83 y.o. male.    Chief Complaint: Follow-up    This is my 1st encounter with this elderly gentleman who returns for follow-up of his suspected diagnosis of gastroparesis.  See the GI nurse practitioner's note below.  He is a former patient of Dr. Gandhi, who we assume made the diagnosis of gastroparesis showed several months ago.  He was on Reglan for a while, but stopped that, saying that it made him sleepy and fatigued.  He is now taking align, which seems to be helping with the bloating.  He was due for colonoscopy, but can not come off his warfarin (is seeing Dr. Bradley).  History is significant in that he also has an aortic valve prosthesis.  Also, he reports he is eating his usual small portions.  But also, usually eats nothing after lunch) except maybe a small sandwich around 3 or 4 in the afternoon.        See recent GI OV note, w NP 8/8/2023:  Patient is new to me. Referred by Dr. Overton for abdominal bloating.  Former patient of Dr. Gandhi.     Abdominal Pain  This is a chronic problem. The current episode started more than 1 month ago (Started ~ 5-6 months ago). The onset quality is sudden. The problem occurs intermittently. The problem has been unchanged. The pain is located in the generalized abdominal region. The quality of the pain is a sensation of fullness and aching (describes as bloating). The abdominal pain does not radiate. Associated symptoms include constipation (bowel movements are 1-2 times per day; reports a feeling of incomplete emptying; taking Colace 100 mg BID) and nausea (Hx of gastroparesis; tried Reglan but he stopped taking because it caused fatigue). Pertinent negatives include no anorexia, belching, diarrhea, dysuria, fever, flatus, frequency, hematochezia, melena, vomiting or weight loss. The pain is aggravated by palpation and eating. The pain is relieved by Nothing. He has tried proton pump inhibitors and H2 blockers for the  symptoms. The treatment provided no relief. Prior diagnostic workup includes GI consult, upper endoscopy and CT scan. His past medical history is significant for abdominal surgery, gallstones (Hx of cholecystectomy) and GERD (Hx of GERD- well controlled taking Protonix 40 mg once daily and Famotidine 20 mg BID). There is no history of colon cancer, Crohn's disease, irritable bowel syndrome, pancreatitis, PUD or ulcerative colitis.      Review of Systems   Constitutional:  Negative for appetite change, fever, unexpected weight change and weight loss.   HENT:  Negative for trouble swallowing.    Gastrointestinal:  Positive for abdominal distention, abdominal pain, constipation (bowel movements are 1-2 times per day; reports a feeling of incomplete emptying; taking Colace 100 mg BID) and nausea (Hx of gastroparesis; tried Reglan but he stopped taking because it caused fatigue). Negative for anal bleeding, anorexia, blood in stool, diarrhea, flatus, hematochezia, melena, rectal pain and vomiting.       See CT scan 7/25/2023:  Impression:   1. No acute abnormality identified within the abdomen and pelvis.  2. Diffuse hepatic steatosis.  3. Moderate stool within the ascending and transverse colon.   Electronically signed by: Aryan Joshi MD  Date:                                            07/25/2023      See last ECHO report 6/20/2016 :  CONCLUSIONS     1 - Biatrial enlargement.     2 - Concentric hypertrophy.     3 - Mildly to moderately depressed left ventricular systolic function (EF 40-45%).     4 - Left ventricular diastolic dysfunction.     5 - Mildly to moderately depressed right ventricular systolic function .     6 - The estimated PA systolic pressure is 36 mmHg.     7 - Aortic valve prosthesis, effective prosthetic valve area corrected for BSA is 0.77 cm2.     8 - Mild tricuspid regurgitation.     9 - There is evidence of a trivial  right to left shunt across the atrial septum only with valsalva.   This  document has been electronically    SIGNED BY: Austin Rios MD On: 06/20/2016       Review of Systems   Constitutional:  Negative for appetite change, fever and unexpected weight change.   HENT: Negative.  Negative for mouth sores, sore throat and trouble swallowing.    Eyes: Negative.    Respiratory: Negative.  Negative for cough and choking.    Cardiovascular: Negative.  Negative for chest pain and leg swelling.   Gastrointestinal:  Positive for abdominal distention (Bloating.), abdominal pain (Gas pains.) and constipation. Negative for anal bleeding, blood in stool, diarrhea, nausea and vomiting.   Genitourinary: Negative.    Musculoskeletal: Negative.    Integumentary:  Negative for color change and rash. Negative.   Neurological: Negative.    Hematological:  Negative for adenopathy.   Psychiatric/Behavioral: Negative.            Objective     Physical Exam  Vitals and nursing note reviewed.   Constitutional:       Appearance: He is well-developed.   HENT:      Mouth/Throat:      Mouth: Mucous membranes are moist.      Pharynx: No oropharyngeal exudate.   Eyes:      General: No scleral icterus.  Neck:      Thyroid: No thyromegaly.      Trachea: No tracheal deviation.   Cardiovascular:      Rate and Rhythm: Normal rate and regular rhythm.      Heart sounds: Murmur (Systolic murmur.) heard.   Pulmonary:      Effort: Pulmonary effort is normal.      Breath sounds: Normal breath sounds.   Abdominal:      General: Bowel sounds are normal. There is no distension.      Palpations: Abdomen is soft. There is no mass.      Tenderness: There is no abdominal tenderness. There is no guarding.   Lymphadenopathy:      Cervical: No cervical adenopathy.   Skin:     General: Skin is warm and dry.      Findings: No rash.   Neurological:      General: No focal deficit present.      Mental Status: He is alert and oriented to person, place, and time.   Psychiatric:         Mood and Affect: Mood normal.         Behavior: Behavior  normal.            Assessment and Plan     Gastroparesis  -     miSOPROStoL (CYTOTEC) 100 MCG Tab; Take 1 tablet (100 mcg total) by mouth 4 (four) times daily before meals and nightly.  Dispense: 120 tablet; Refill: 11    Bloating    Slow transit constipation    Long term (current) use of anticoagulants      Trial of misoprostol, starting at 100 mcg q.a.c. and HS.  Continue small but frequent meals.  RTC 2-3 months.

## 2023-10-30 NOTE — PROGRESS NOTES
Subjective:       Patient ID: Hernan Echavarria is a 83 y.o. male, Body mass index is 22.13 kg/m².    Chief Complaint: Follow-up      Established patient of Dr. Torrez & myself.    Abdominal Pain  This is a chronic problem. The current episode started more than 1 year ago. The onset quality is sudden. The problem occurs intermittently. The problem has been gradually improving. The pain is located in the generalized abdominal region. The quality of the pain is a sensation of fullness (describes as bloating). The abdominal pain does not radiate. Associated symptoms include constipation (chronic problem; bowel movements are 1-2 times per day; describes stool as type 1, 4 or 5 on bristol scale; taking Colace 100 mg BID helps). Pertinent negatives include no anorexia, belching, diarrhea, dysuria, fever, flatus, frequency, hematochezia, melena, nausea, vomiting or weight loss. Exacerbated by: drinking water; worse at night. The pain is relieved by Nothing. He has tried proton pump inhibitors and H2 blockers (Currently: OTC Align once daily- no improvement; recently started Cytotec 100 mcg QID- mild improvement) for the symptoms. Prior diagnostic workup includes GI consult, CT scan, ultrasound and upper endoscopy (Could not complete c-scope recommended during last office visit bc cardiology would not let him stop blood thinner). His past medical history is significant for abdominal surgery, gallstones (Hx of cholecystectomy), GERD (Hx of GERD- well controlled taking Protonix 40 mg once daily and Pepcid 20 mg BID) and irritable bowel syndrome. There is no history of colon cancer, Crohn's disease, pancreatitis, PUD or ulcerative colitis (Hx of gastroparesis).     Review of Systems   Constitutional:  Negative for appetite change, fever, unexpected weight change and weight loss.   HENT:  Negative for trouble swallowing.    Respiratory:  Negative for cough and shortness of breath.    Cardiovascular:  Negative for chest pain.    Gastrointestinal:  Positive for abdominal distention, abdominal pain and constipation (chronic problem; bowel movements are 1-2 times per day; describes stool as type 1, 4 or 5 on bristol scale; taking Colace 100 mg BID helps). Negative for anal bleeding, anorexia, blood in stool, diarrhea, flatus, hematochezia, melena, nausea, rectal pain and vomiting.   Genitourinary:  Negative for difficulty urinating, dysuria and frequency.   Musculoskeletal:  Negative for gait problem.   Skin:  Negative for rash.   Neurological:  Negative for speech difficulty.   Psychiatric/Behavioral:  Negative for confusion.        Past Medical History:   Diagnosis Date    Anticoagulant long-term use     Arthritis     Atrial flutter     Cervical disc disease     Coronary artery disease     Gastritis and duodenitis     GERD (gastroesophageal reflux disease)     Heartburn     Hyperlipidemia     Hypertension     Osteoarthritis     Personal history of colonic polyps     S/P AVR (aortic valve replacement) 10/15/2003    CARBOMEDICS R500 &F700    Valvular disease       Past Surgical History:   Procedure Laterality Date    BACK SURGERY      lower back    CARDIAC SURGERY      MECHANICAL HEART VALVE    CARPAL TUNNEL RELEASE      CHOLECYSTECTOMY      COLONOSCOPY  09/02/2016    Dr. Gandhi; 3 mm sessile polyp in descending colon not removed due to INR    EYE SURGERY      CATARACT LENS IMPLANTS    HERNIA REPAIR      UPPER GASTROINTESTINAL ENDOSCOPY  03/07/2023    Dr. Gandhi; normal esophagus; acute gastritis; duodenitis; no bx taken as he remains on anticoagulants per cardiology request      Family History   Problem Relation Age of Onset    Hypertension Other     Heart disease Other     Peripheral vascular disease Mother       Wt Readings from Last 10 Encounters:   10/30/23 78.2 kg (172 lb 6.4 oz)   10/09/23 77.2 kg (170 lb 3.1 oz)   08/08/23 80.4 kg (177 lb 4 oz)   08/02/23 77.6 kg (171 lb)   07/12/23 78 kg (171 lb 14.4 oz)   05/23/23 79.1 kg (174 lb  6.4 oz)   05/19/23 80.7 kg (178 lb)   05/04/23 77.7 kg (171 lb 4.8 oz)   04/14/23 78.9 kg (174 lb)   03/27/23 78.2 kg (172 lb 4.8 oz)     Lab Results   Component Value Date    WBC 5.15 05/24/2023    HGB 12.9 (L) 05/24/2023    HCT 40.0 05/24/2023     (H) 05/24/2023     05/24/2023     CMP  Sodium   Date Value Ref Range Status   06/07/2023 139 136 - 145 mmol/L Final     Potassium   Date Value Ref Range Status   06/07/2023 4.8 3.5 - 5.1 mmol/L Final     Comment:     Anion Gap reference range revised on 4/28/2023     Chloride   Date Value Ref Range Status   06/07/2023 105 95 - 110 mmol/L Final     CO2   Date Value Ref Range Status   06/07/2023 27 22 - 31 mmol/L Final     Glucose   Date Value Ref Range Status   06/07/2023 108 70 - 110 mg/dL Final     Comment:     The ADA recommends the following guidelines for fasting glucose:    Normal:       less than 100 mg/dL    Prediabetes:  100 mg/dL to 125 mg/dL    Diabetes:     126 mg/dL or higher       BUN   Date Value Ref Range Status   06/07/2023 20 9 - 21 mg/dL Final     Creatinine   Date Value Ref Range Status   08/09/2023 1.84 (H) 0.50 - 1.40 mg/dL Final     Calcium   Date Value Ref Range Status   06/07/2023 9.1 8.4 - 10.2 mg/dL Final     Total Protein   Date Value Ref Range Status   05/24/2023 7.5 6.0 - 8.4 g/dL Final     Albumin   Date Value Ref Range Status   06/07/2023 4.5 3.5 - 5.2 g/dL Final     Total Bilirubin   Date Value Ref Range Status   05/24/2023 1.6 (H) 0.2 - 1.3 mg/dL Final     Alkaline Phosphatase   Date Value Ref Range Status   05/24/2023 68 38 - 145 U/L Final     AST ([place])   Date Value Ref Range Status   11/10/2015 95 (H) 17 - 59 U/L Final     AST   Date Value Ref Range Status   05/24/2023 45 17 - 59 U/L Final     ALT   Date Value Ref Range Status   05/24/2023 30 0 - 50 U/L Final     Anion Gap   Date Value Ref Range Status   06/07/2023 7 mmol/L Final     Comment:     Anion Gap reference range revised on 4/28/2023     eGFR if   American   Date Value Ref Range Status   06/13/2022 39 (A) >60 mL/min/1.73 m^2 Final     eGFR if non    Date Value Ref Range Status   06/13/2022 34 (A) >60 mL/min/1.73 m^2 Final     Comment:     Calculation used to obtain the estimated glomerular filtration  rate (eGFR) is the CKD-EPI equation.        Lab Results   Component Value Date    AMYLASE 82 01/26/2023     Lab Results   Component Value Date    LIPASE 49 07/21/2016     Lab Results   Component Value Date    LIPASERES 81 01/26/2023             Reviewed prior medical records including radiology report of CT of abdomen and pelvis 7/25/23 & endoscopy history (see surgical history).     Objective:      Physical Exam  Constitutional:       General: He is not in acute distress.     Appearance: He is well-developed.   HENT:      Head: Normocephalic.      Right Ear: Hearing normal.      Left Ear: Hearing normal.      Nose: Nose normal.      Mouth/Throat:      Mouth: No oral lesions.      Pharynx: Uvula midline.   Eyes:      General: Lids are normal.      Conjunctiva/sclera: Conjunctivae normal.      Pupils: Pupils are equal, round, and reactive to light.   Neck:      Trachea: Trachea normal.   Cardiovascular:      Rate and Rhythm: Normal rate and regular rhythm.      Heart sounds: Murmur heard.   Pulmonary:      Effort: Pulmonary effort is normal. No respiratory distress.      Breath sounds: Normal breath sounds. No stridor. No wheezing.   Abdominal:      General: Bowel sounds are normal. There is no distension.      Palpations: Abdomen is soft. There is no mass.      Tenderness: There is no abdominal tenderness. There is no guarding or rebound.   Musculoskeletal:         General: Normal range of motion.      Cervical back: Normal range of motion.   Skin:     General: Skin is warm and dry.      Findings: No rash.      Comments: Non jaundiced   Neurological:      Mental Status: He is alert and oriented to person, place, and time.   Psychiatric:          Speech: Speech normal.         Behavior: Behavior normal. Behavior is cooperative.           Assessment:       1. Abdominal bloating    2. Gastroparesis    3. Chronic idiopathic constipation    4. History of gastroesophageal reflux (GERD)    5. History of colon polyps    6. S/P cholecystectomy    7. Current use of anticoagulant therapy           Plan:   All diagnoses and orders for this visit:    Abdominal bloating  - Start: linaCLOtide (LINZESS) 72 mcg Cap capsule; Take 1 capsule (72 mcg total) by mouth before breakfast.  Dispense: 90 capsule; Refill: 0  - Continue Cytotec 100 mcg QID (per Dr. Torrez)  - Recommended OTC simethicone as directed, such as Phazyme or Gas-x  - Discussed with patient about low gas diet: Reduce or eliminate these foods from your diet: Broccoli, Cauliflower, Dow sprouts, Cabbage, Cooked dried beans, Carbonated beverages (sparkling water, soda, beer, champagne)    Gastroparesis  - Recommend small frequent meals instead of 3 big meals a day, low fat meals & low residue diet.  - Avoid: high fiber (insoluble fiber), red meats, dairy, and non-digestible solids (peels, fruit pulp, etc). Avoid NSAIDs and narcotics.  - Continue Cytotec 100 mcg QID (per Dr. Torrez)    Chronic idiopathic constipation  - Start: linaCLOtide (LINZESS) 72 mcg Cap capsule; Take 1 capsule (72 mcg total) by mouth before breakfast.  Dispense: 90 capsule; Refill: 0  - Recommend daily exercise as tolerated, adequate water intake, and high fiber diet.   - Recommend OTC fiber supplement (Benefiber)    History of gastroesophageal reflux (GERD)   - Continue Protonix 40 mg once daily   - Continue Pepcid 20 mg BID   - Take PPI in the morning 30 minutes before breakfast  - Recommend to avoid large meals, avoid eating within 3 hours of bedtime, elevate head of bed if nocturnal symptoms are present, smoking cessation (if current smoker), & weight loss (if overweight).   - Recommend minimize/avoid high-fat foods, chocolate,  caffeine, citrus, alcohol, & tomato products.  - Advised to avoid/limit use of NSAID's, since they can cause GI upset, bleeding, and/or ulcers. If needed, take with food.      History of colon polyps   - Will complete once patient able to stop Coumadin    S/P cholecystectomy    Current use of anticoagulant therapy    Recommend follow-up with Dr. Torrez in 4-6 weeks for continued evaluation and management.  If no improvement in symptoms or symptoms worsen, call/follow-up at clinic or go to ER.

## 2023-12-06 ENCOUNTER — TELEPHONE (OUTPATIENT)
Dept: GASTROENTEROLOGY | Facility: CLINIC | Age: 83
End: 2023-12-06
Payer: MEDICARE

## 2023-12-06 NOTE — TELEPHONE ENCOUNTER
Returned call to the patient, patient states that he continues to go round in circles with Dr Bradley's staff regarding being able to get a colonoscopy, advised patient that I would call them on his behalf, called [phone], left message, clinic number provided.

## 2023-12-06 NOTE — TELEPHONE ENCOUNTER
----- Message from Marcos Delgado sent at 12/6/2023 12:33 PM CST -----  Contact: pt  Type: Needs Medical Advice  Who Called:  pt  Best Call Back Number: [phone]    Additional Information: Pt is calling the office trying to see if pt can get a clearance so pt can have a colonoscopy to  office.Please call back and advise.

## 2023-12-06 NOTE — TELEPHONE ENCOUNTER
Returned call to the patient, pat advised that due to his cardiologist advising I'm to cancel his colonoscopy in 09/2023 that we will need to approval from his cardiologist to be able to schedule his procedure, patient verbalized understanding of this.

## 2023-12-06 NOTE — TELEPHONE ENCOUNTER
----- Message from Patrice Henley sent at 12/6/2023  2:23 PM CST -----  Type: Needs Medical Advice    Who Called:  Pt    Best Call Back Number: [phone]    Additional Information: Pt needs clearance from doctor to get his colonoscopy and get off of blood thinning medicine. Pt is not feelling the best and hasn't been getting better. He would like to talk to nurse dunn. Please call back to advise, Thanks!

## 2023-12-07 ENCOUNTER — TELEPHONE (OUTPATIENT)
Dept: GASTROENTEROLOGY | Facility: CLINIC | Age: 83
End: 2023-12-07
Payer: MEDICARE

## 2023-12-07 NOTE — TELEPHONE ENCOUNTER
----- Message from Marybeth Lou sent at 12/7/2023  2:21 PM CST -----  Regarding: procedure questions  Contact: pt  Type:  Needs Medical Advice    Who Called: pt  Would the patient rather a call back or a response via [facility]? Call back  Best Call Back Number: [phone]    Additional Information: sts she would like to speak to someone regarding his procedure

## 2023-12-07 NOTE — TELEPHONE ENCOUNTER
Called and spoke with the patient, procedure scheduled with the patient, patient to come to the clinic today to  his prep instructions, form to hold coumadin faxed to [phone] Dr. Bradley's office for approval as patient will need to bridge his blood thinner, patient verbalized understanding of this.

## 2023-12-07 NOTE — TELEPHONE ENCOUNTER
----- Message from Alessandra Acosta sent at 12/6/2023  4:51 PM CST -----  Contact: [facility]  Type: Needs Medical Advice  Who Called:  [facility]  Best Call Back Number: [phone] fax [phone]  Additional Information: needs medical clearence

## 2023-12-07 NOTE — TELEPHONE ENCOUNTER
Called and spoke with the patient, patient notified that the surgery center will call him closer to the date of his procedure to assign his arrival time, patient verbalized understanding of this.

## 2023-12-08 ENCOUNTER — TELEPHONE (OUTPATIENT)
Dept: GASTROENTEROLOGY | Facility: CLINIC | Age: 83
End: 2023-12-08
Payer: MEDICARE

## 2023-12-08 NOTE — TELEPHONE ENCOUNTER
Attempted to return call to Rebekah left message with answering service asking for a call back to the clinic.

## 2023-12-08 NOTE — TELEPHONE ENCOUNTER
----- Message from Edwige Lora sent at 12/8/2023  1:55 PM CST -----  Type: Needs Medical Advice  Who Called: Rebekah blum/Dr Bradley office  Best Call Back Number: [phone]  Additional Information: calling to speak with the nurse for cardio clearance pt is having a procedure next week, pl call bk and advise thanks

## 2023-12-12 ENCOUNTER — OFFICE VISIT (OUTPATIENT)
Dept: NEPHROLOGY | Facility: CLINIC | Age: 83
End: 2023-12-12
Payer: MEDICARE

## 2023-12-12 VITALS
HEART RATE: 57 BPM | BODY MASS INDEX: 22.1 KG/M2 | DIASTOLIC BLOOD PRESSURE: 70 MMHG | HEIGHT: 74 IN | SYSTOLIC BLOOD PRESSURE: 138 MMHG | WEIGHT: 172.19 LBS | OXYGEN SATURATION: 98 %

## 2023-12-12 DIAGNOSIS — I10 PRIMARY HYPERTENSION: ICD-10-CM

## 2023-12-12 DIAGNOSIS — N17.9 AKI (ACUTE KIDNEY INJURY): ICD-10-CM

## 2023-12-12 DIAGNOSIS — N18.32 CHRONIC KIDNEY DISEASE, STAGE 3B: Primary | ICD-10-CM

## 2023-12-12 DIAGNOSIS — N25.81 SECONDARY RENAL HYPERPARATHYROIDISM: ICD-10-CM

## 2023-12-12 PROCEDURE — 1101F PR PT FALLS ASSESS DOC 0-1 FALLS W/OUT INJ PAST YR: ICD-10-PCS | Mod: CPTII,S$GLB,, | Performed by: INTERNAL MEDICINE

## 2023-12-12 PROCEDURE — 3078F PR MOST RECENT DIASTOLIC BLOOD PRESSURE < 80 MM HG: ICD-10-PCS | Mod: CPTII,S$GLB,, | Performed by: INTERNAL MEDICINE

## 2023-12-12 PROCEDURE — 3288F PR FALLS RISK ASSESSMENT DOCUMENTED: ICD-10-PCS | Mod: CPTII,S$GLB,, | Performed by: INTERNAL MEDICINE

## 2023-12-12 PROCEDURE — 1160F RVW MEDS BY RX/DR IN RCRD: CPT | Mod: CPTII,S$GLB,, | Performed by: INTERNAL MEDICINE

## 2023-12-12 PROCEDURE — 99214 PR OFFICE/OUTPT VISIT, EST, LEVL IV, 30-39 MIN: ICD-10-PCS | Mod: S$GLB,,, | Performed by: INTERNAL MEDICINE

## 2023-12-12 PROCEDURE — 99999 PR PBB SHADOW E&M-EST. PATIENT-LVL III: CPT | Mod: PBBFAC,,, | Performed by: INTERNAL MEDICINE

## 2023-12-12 PROCEDURE — 1159F PR MEDICATION LIST DOCUMENTED IN MEDICAL RECORD: ICD-10-PCS | Mod: CPTII,S$GLB,, | Performed by: INTERNAL MEDICINE

## 2023-12-12 PROCEDURE — 1160F PR REVIEW ALL MEDS BY PRESCRIBER/CLIN PHARMACIST DOCUMENTED: ICD-10-PCS | Mod: CPTII,S$GLB,, | Performed by: INTERNAL MEDICINE

## 2023-12-12 PROCEDURE — 1101F PT FALLS ASSESS-DOCD LE1/YR: CPT | Mod: CPTII,S$GLB,, | Performed by: INTERNAL MEDICINE

## 2023-12-12 PROCEDURE — 99999 PR PBB SHADOW E&M-EST. PATIENT-LVL III: ICD-10-PCS | Mod: PBBFAC,,, | Performed by: INTERNAL MEDICINE

## 2023-12-12 PROCEDURE — 3075F SYST BP GE 130 - 139MM HG: CPT | Mod: CPTII,S$GLB,, | Performed by: INTERNAL MEDICINE

## 2023-12-12 PROCEDURE — 3288F FALL RISK ASSESSMENT DOCD: CPT | Mod: CPTII,S$GLB,, | Performed by: INTERNAL MEDICINE

## 2023-12-12 PROCEDURE — 1159F MED LIST DOCD IN RCRD: CPT | Mod: CPTII,S$GLB,, | Performed by: INTERNAL MEDICINE

## 2023-12-12 PROCEDURE — 3075F PR MOST RECENT SYSTOLIC BLOOD PRESS GE 130-139MM HG: ICD-10-PCS | Mod: CPTII,S$GLB,, | Performed by: INTERNAL MEDICINE

## 2023-12-12 PROCEDURE — 99214 OFFICE O/P EST MOD 30 MIN: CPT | Mod: S$GLB,,, | Performed by: INTERNAL MEDICINE

## 2023-12-12 PROCEDURE — 3078F DIAST BP <80 MM HG: CPT | Mod: CPTII,S$GLB,, | Performed by: INTERNAL MEDICINE

## 2023-12-12 RX ORDER — ENOXAPARIN SODIUM 100 MG/ML
60 INJECTION SUBCUTANEOUS 2 TIMES DAILY
Status: ON HOLD | COMMUNITY
End: 2023-12-19

## 2023-12-12 NOTE — PROGRESS NOTES
"    Subjective:       Patient ID: Hernan Echavarria is a 83 y.o. Black or  male who presents for return patient evaluation for chronic renal failure.      He reports he has itchy skin chronically.  He also reports he was started on farxiga but does not want to continue it due to cost.  He has had more fatigue for the past 2 months ago.  He denies frequent NSAIDs.  He has seen GI for his constipation and abdominal bloating.      Review of Systems   Constitutional:  Positive for activity change and fatigue. Negative for appetite change, chills and fever.   HENT:  Negative for congestion.    Eyes:  Negative for visual disturbance.   Respiratory:  Negative for cough and shortness of breath.    Cardiovascular:  Negative for chest pain and leg swelling.   Gastrointestinal:  Positive for abdominal distention. Negative for abdominal pain, diarrhea, nausea and vomiting.   Genitourinary:  Negative for difficulty urinating, dysuria and hematuria.   Musculoskeletal:  Negative for myalgias.   Skin:  Negative for rash.        itchy   Neurological:  Negative for headaches.   Psychiatric/Behavioral:  Negative for sleep disturbance.        The past medical, family and social histories were reviewed for this encounter.     /70 (BP Location: Left arm, Patient Position: Sitting, BP Method: Medium (Manual))   Pulse (!) 57   Ht 6' 2" (1.88 m)   Wt 78.1 kg (172 lb 2.9 oz)   SpO2 98%   BMI 22.11 kg/m²     Objective:      Physical Exam  Vitals reviewed.   Constitutional:       General: He is not in acute distress.     Appearance: He is well-developed.   HENT:      Head: Normocephalic and atraumatic.   Eyes:      General: No scleral icterus.     Conjunctiva/sclera: Conjunctivae normal.   Neck:      Vascular: No JVD.   Cardiovascular:      Rate and Rhythm: Normal rate and regular rhythm.      Heart sounds: Normal heart sounds. No murmur heard.     No friction rub. No gallop.      Comments: Mechanical click  Pulmonary: "      Effort: Pulmonary effort is normal. No respiratory distress.      Breath sounds: Normal breath sounds. No wheezing.   Abdominal:      General: Bowel sounds are normal. There is distension.      Palpations: Abdomen is soft.      Tenderness: There is no abdominal tenderness.   Musculoskeletal:      Cervical back: Normal range of motion.      Right lower leg: No edema.      Left lower leg: No edema.   Skin:     General: Skin is warm and dry.      Findings: No rash.   Neurological:      Mental Status: He is alert and oriented to person, place, and time.   Psychiatric:         Mood and Affect: Mood normal.         Behavior: Behavior normal.         Assessment:       1. Chronic kidney disease, stage 3b    2. Primary hypertension    3. Secondary renal hyperparathyroidism    4. AME (acute kidney injury)        Lab Results   Component Value Date    CREATININE 2.30 (H) 12/06/2023    BUN 34 (H) 12/06/2023     12/06/2023    K 4.8 12/06/2023     12/06/2023    CO2 24 12/06/2023     Lab Results   Component Value Date    .1 (H) 12/06/2023    CALCIUM 9.3 12/06/2023    PHOS 3.7 12/06/2023     Lab Results   Component Value Date    HCT 37.6 (L) 12/01/2023     Prot/Creat Ratio, Urine   Date Value Ref Range Status   12/06/2023 193.1 (H) 15.0 - 68.0 mg/g Final   06/07/2023 606.3 (H) 15.0 - 68.0 mg/g Final     Plan:   Return to clinic in 4 months.  Labs for next visit include rp pth upc per standing orders.  RP at the end of this month.  Baseline creatinine is 1.4-1.7 since 2015.  PTH is 125 with a calcium of 9.3.  Renal US shows R 10.4 cm L 10.8 cm.  UPC is negative.

## 2023-12-13 ENCOUNTER — TELEPHONE (OUTPATIENT)
Dept: GASTROENTEROLOGY | Facility: CLINIC | Age: 83
End: 2023-12-13
Payer: MEDICARE

## 2023-12-15 ENCOUNTER — ANESTHESIA EVENT (OUTPATIENT)
Dept: ENDOSCOPY | Facility: HOSPITAL | Age: 83
End: 2023-12-15
Payer: MEDICARE

## 2023-12-15 ENCOUNTER — HOSPITAL ENCOUNTER (OUTPATIENT)
Facility: HOSPITAL | Age: 83
Discharge: HOME | End: 2023-12-15
Attending: INTERNAL MEDICINE | Admitting: STUDENT IN AN ORGANIZED HEALTH CARE EDUCATION/TRAINING PROGRAM
Payer: MEDICARE

## 2023-12-15 ENCOUNTER — ANESTHESIA (OUTPATIENT)
Dept: ENDOSCOPY | Facility: HOSPITAL | Age: 83
End: 2023-12-15
Payer: MEDICARE

## 2023-12-15 VITALS
WEIGHT: 172 LBS | SYSTOLIC BLOOD PRESSURE: 127 MMHG | BODY MASS INDEX: 22.07 KG/M2 | DIASTOLIC BLOOD PRESSURE: 72 MMHG | HEIGHT: 74 IN | HEART RATE: 58 BPM | OXYGEN SATURATION: 99 % | RESPIRATION RATE: 14 BRPM | TEMPERATURE: 98 F

## 2023-12-15 DIAGNOSIS — R53.83 FATIGUE: ICD-10-CM

## 2023-12-15 PROCEDURE — D9220A PRA ANESTHESIA: Mod: ANES,,, | Performed by: ANESTHESIOLOGY

## 2023-12-15 PROCEDURE — D9220A PRA ANESTHESIA: ICD-10-PCS | Mod: ANES,,, | Performed by: ANESTHESIOLOGY

## 2023-12-15 PROCEDURE — 45385 COLONOSCOPY W/LESION REMOVAL: CPT | Mod: ,,, | Performed by: INTERNAL MEDICINE

## 2023-12-15 PROCEDURE — 45385 COLONOSCOPY W/LESION REMOVAL: CPT | Mod: PO | Performed by: INTERNAL MEDICINE

## 2023-12-15 PROCEDURE — 25000003 PHARM REV CODE 250: Mod: PO | Performed by: NURSE ANESTHETIST, CERTIFIED REGISTERED

## 2023-12-15 PROCEDURE — 37000009 HC ANESTHESIA EA ADD 15 MINS: Mod: PO | Performed by: INTERNAL MEDICINE

## 2023-12-15 PROCEDURE — 88305 TISSUE EXAM BY PATHOLOGIST: CPT | Mod: 59,PO | Performed by: STUDENT IN AN ORGANIZED HEALTH CARE EDUCATION/TRAINING PROGRAM

## 2023-12-15 PROCEDURE — 45385 PR COLONOSCOPY,REMV LESN,SNARE: ICD-10-PCS | Mod: ,,, | Performed by: INTERNAL MEDICINE

## 2023-12-15 PROCEDURE — 88305 TISSUE EXAM BY PATHOLOGIST: ICD-10-PCS | Mod: 26,,, | Performed by: STUDENT IN AN ORGANIZED HEALTH CARE EDUCATION/TRAINING PROGRAM

## 2023-12-15 PROCEDURE — D9220A PRA ANESTHESIA: Mod: CRNA,,, | Performed by: NURSE ANESTHETIST, CERTIFIED REGISTERED

## 2023-12-15 PROCEDURE — D9220A PRA ANESTHESIA: ICD-10-PCS | Mod: CRNA,,, | Performed by: NURSE ANESTHETIST, CERTIFIED REGISTERED

## 2023-12-15 PROCEDURE — 37000008 HC ANESTHESIA 1ST 15 MINUTES: Mod: PO | Performed by: INTERNAL MEDICINE

## 2023-12-15 PROCEDURE — 88305 TISSUE EXAM BY PATHOLOGIST: CPT | Mod: 26,,, | Performed by: STUDENT IN AN ORGANIZED HEALTH CARE EDUCATION/TRAINING PROGRAM

## 2023-12-15 PROCEDURE — 63600175 PHARM REV CODE 636 W HCPCS: Mod: PO | Performed by: INTERNAL MEDICINE

## 2023-12-15 PROCEDURE — 63600175 PHARM REV CODE 636 W HCPCS: Mod: PO | Performed by: NURSE ANESTHETIST, CERTIFIED REGISTERED

## 2023-12-15 PROCEDURE — 27201012 HC FORCEPS, HOT/COLD, DISP: Mod: PO | Performed by: INTERNAL MEDICINE

## 2023-12-15 RX ORDER — SODIUM CHLORIDE 0.9 % (FLUSH) 0.9 %
10 SYRINGE (ML) INJECTION
Status: DISCONTINUED | OUTPATIENT
Start: 2023-12-15 | End: 2023-12-15 | Stop reason: HOSPADM

## 2023-12-15 RX ORDER — SODIUM CHLORIDE, SODIUM LACTATE, POTASSIUM CHLORIDE, CALCIUM CHLORIDE 600; 310; 30; 20 MG/100ML; MG/100ML; MG/100ML; MG/100ML
INJECTION, SOLUTION INTRAVENOUS CONTINUOUS
Status: DISCONTINUED | OUTPATIENT
Start: 2023-12-15 | End: 2023-12-15 | Stop reason: HOSPADM

## 2023-12-15 RX ORDER — PROPOFOL 10 MG/ML
VIAL (ML) INTRAVENOUS CONTINUOUS PRN
Status: DISCONTINUED | OUTPATIENT
Start: 2023-12-15 | End: 2023-12-15

## 2023-12-15 RX ORDER — PROPOFOL 10 MG/ML
INJECTION, EMULSION INTRAVENOUS
Status: DISCONTINUED | OUTPATIENT
Start: 2023-12-15 | End: 2023-12-15

## 2023-12-15 RX ORDER — CEFAZOLIN SODIUM 2 G/50ML
2 SOLUTION INTRAVENOUS ONCE
Status: COMPLETED | OUTPATIENT
Start: 2023-12-15 | End: 2023-12-15

## 2023-12-15 RX ORDER — LIDOCAINE HYDROCHLORIDE 20 MG/ML
INJECTION INTRAVENOUS
Status: DISCONTINUED | OUTPATIENT
Start: 2023-12-15 | End: 2023-12-15

## 2023-12-15 RX ADMIN — CEFAZOLIN SODIUM 2 G: 2 SOLUTION INTRAVENOUS at 01:12

## 2023-12-15 RX ADMIN — PROPOFOL 50 MG: 10 INJECTION, EMULSION INTRAVENOUS at 01:12

## 2023-12-15 RX ADMIN — PROPOFOL 50 MCG/KG/MIN: 10 INJECTION, EMULSION INTRAVENOUS at 01:12

## 2023-12-15 RX ADMIN — SODIUM CHLORIDE, POTASSIUM CHLORIDE, SODIUM LACTATE AND CALCIUM CHLORIDE: 600; 310; 30; 20 INJECTION, SOLUTION INTRAVENOUS at 01:12

## 2023-12-15 RX ADMIN — LIDOCAINE HYDROCHLORIDE 100 MG: 20 INJECTION INTRAVENOUS at 01:12

## 2023-12-15 NOTE — H&P
"History & Physical - Short Stay  Gastroenterology      SUBJECTIVE:     Procedure: Colonoscopy    Chief Complaint/Indication for Procedure: Fatigue.  Abdo pain.    History of Present Illness:  Asymptomatic    Rosario ValenteERYN     12/13/23  1:01 PM  Note  Received letter of clearance from Dr. Bradley with the lovenox bridge attached.            See recent visit with PCP:  Office Visit   12/6/2023  [facility]       Trini Laurent FNP  Family Medicine Fatigue, unspecified type +8 more  Dx     Progress Notes    Trini Laurent FNP at 12/6/2023 11:00 AM    Status: Signed             Electronically signed by Trini Laurent FNP at 12/6/2023 12:54 PM     Trini Laurent FNP at 12/6/2023 11:00 AM    Status: Signed   Expand All Collapse All     Subjective   Patient ID: Hernan Echavarria is a 83 y.o. male.     Chief Complaint: No chief complaint on file.     83 year old male seen for fatigue. Reporting gradually worsening fatigue and NEWMAN. Becoming fatigued performing ADLs. I "feel strange". Was evaluated by PCP a week ago for c/o NEWMAN and fatigue. Has CAD, CHF, and Hx of aortic valve replacement, on coumadin. Reviewed labs ordered at last appointment. CBC, developed mild anemia about ten months ago but stable, no indication of infection. Has CKD, creatine and GFR stable, establishing with Dr. Overton on the 15th, previously followed by Dr. Carolina. TSH normal. BNP elevated at 4,440, compliant with Lasix and spironolactone per patient. Was evaluated by cardiology last week, did endorse fatigue at that time. Weight stable. CXR negative. Seeing Dr. Torrez for abdominal "bloating", diagnosed with gastroparesis. Started on Linzess and cytotec. Has history of colon polyps, GI wants to perform colonoscopy and cardiology has it on hold.      Fatigue  This is a chronic problem. The current episode started more than 1 month ago. The problem occurs constantly. The problem has been gradually worsening. Associated symptoms " include fatigue. Pertinent negatives include no abdominal pain, anorexia, arthralgias, change in bowel habit, chest pain, chills, congestion, coughing, diaphoresis, fever, headaches, joint swelling, myalgias, nausea, neck pain, numbness, rash, sore throat, swollen glands, urinary symptoms, vertigo, visual change, vomiting or weakness. The symptoms are aggravated by walking.      Review of Systems   Constitutional:  Positive for fatigue. Negative for chills, diaphoresis and fever.   HENT:  Negative for nasal congestion and sore throat.    Respiratory:  Negative for cough.    Cardiovascular:  Negative for chest pain.   Gastrointestinal:  Negative for abdominal pain, anorexia, change in bowel habit, nausea and vomiting.     Assessment and Plan   1. Fatigue, unspecified type - will work up anemia further, most likely 2/2 CKD but will check iron and B12. Follow up as planned with GI and cardiology.   -     Urinalysis; Future; Expected date: 12/06/2023     2. NEWMAN (dyspnea on exertion)      3. Chronic combined systolic and diastolic heart failure     4. Coronary artery disease involving native coronary artery of native heart without angina pectoris     5. Primary hypertension     6. Hx of aortic valve replacement, mechanical     7. Stage 3b chronic kidney disease     8. Anemia, unspecified type  -     Vitamin B12; Future; Expected date: 12/06/2023  -     Iron and TIBC; Future; Expected date: 12/06/2023  -     Ferritin; Future; Expected date: 12/06/2023     9. Other vitamin B12 deficiency anemias  -     Vitamin B12; Future; Expected date: 12/06/2023               See last CT Abdo & Pelvis ,  7/25/2023:  FINDINGS:  There is atelectasis/scarring at the lung bases.  There is diffuse hepatic steatosis.  The gallbladder is surgically absent.  The pancreas, spleen, and adrenal glands are unremarkable.  The right kidney is in the expected location and demonstrates no hydronephrosis or obstructing calculus.  The left kidney is  demonstrated within the left lower quadrant abdomen.  There is no hydronephrosis or obstructing calculus.  The appendix is normal.  There is no bowel obstruction.  There is moderate colonic stool within the ascending and transverse colon.  Atherosclerotic calcifications are noted.  Abdominal aorta is not aneurysmal.  Urinary bladder appears unremarkable.  No acute displaced fractures identified.  Degenerative changes are noted within the spine.     Impression:   1. No acute abnormality identified within the abdomen and pelvis.  2. Diffuse hepatic steatosis.  3. Moderate stool within the ascending and transverse colon.      Electronically signed by: Aryan Joshi MD  Date:                                            07/25/2023  Time:                                           15:01      Last colonoscopy 9/2/2026:  No source of generalized abd pain.  Tiny polyp not removed due to INR. (Descending colon.  Hiram.            PTA Medications   Medication Sig    atenolol (TENORMIN) 50 MG tablet Take 50 mg by mouth every morning.    enoxaparin (LOVENOX) 60 mg/0.6 mL Syrg Inject into the skin.    gabapentin (NEURONTIN) 300 MG capsule Take 300 mg by mouth once daily.    hydrOXYzine HCL (ATARAX) 25 MG tablet Take 1 tablet by mouth nightly as needed for Itching.    pantoprazole (PROTONIX) 40 MG tablet Take 40 mg by mouth once daily.    rosuvastatin (CRESTOR) 10 MG tablet Take 10 mg by mouth every evening.    tamsulosin (FLOMAX) 0.4 mg Cap Take 1 capsule by mouth Daily.    warfarin (COUMADIN) 5 MG tablet Take 2.5-5 mg by mouth once daily. Take 1/2-1 pill by mouth daily or as directed per the [facility]; Current dose per patient (1/17/23): Warfarin 5mg daily, except 2.5mg on Tuesdays and Thursdays    aspirin 81 MG Chew Take 81 mg by mouth once daily.    cyanocobalamin (VITAMIN B-12) 100 MCG tablet Take 100 mcg by mouth once daily. Unsure of dose    miSOPROStoL (CYTOTEC) 100 MCG Tab Take 1 tablet (100 mcg total) by mouth 4  (four) times daily before meals and nightly.       Review of patient's allergies indicates:   Allergen Reactions    Hydrocodone-acetaminophen      Other reaction(s): itching    Reglan [metoclopramide] Other (See Comments)     Fatigue    Ciprofloxacin hcl      Other reaction(s): unknown    Metronidazole      Other reaction(s): unknown    Sulfa (sulfonamide antibiotics)      Other reaction(s): unknown        Past Medical History:   Diagnosis Date    Anticoagulant long-term use     Arthritis     Atrial flutter     Cervical disc disease     Coronary artery disease     Gastritis and duodenitis     GERD (gastroesophageal reflux disease)     Heartburn     Hyperlipidemia     Hypertension     Osteoarthritis     Personal history of colonic polyps     S/P AVR (aortic valve replacement) 10/15/2003    CARBOMEDICS R500 &F700    Valvular disease      Past Surgical History:   Procedure Laterality Date    BACK SURGERY      lower back    CARDIAC SURGERY      MECHANICAL HEART VALVE    CARPAL TUNNEL RELEASE      CHOLECYSTECTOMY      COLONOSCOPY  2016    Dr. Gandhi; 3 mm sessile polyp in descending colon not removed due to INR    EYE SURGERY      CATARACT LENS IMPLANTS    HERNIA REPAIR      UPPER GASTROINTESTINAL ENDOSCOPY  2023    Dr. Gandhi; normal esophagus; acute gastritis; duodenitis; no bx taken as he remains on anticoagulants per cardiology request     Family History   Problem Relation Age of Onset    Hypertension Other     Heart disease Other     Peripheral vascular disease Mother      Social History     Tobacco Use    Smoking status: Former     Current packs/day: 0.00     Types: Cigarettes     Quit date: 1965     Years since quittin.1    Smokeless tobacco: Never   Substance Use Topics    Alcohol use: No    Drug use: No         OBJECTIVE:     Vital Signs (Most Recent)  Temp: 97.5 °F (36.4 °C) (12/15/23 1319)  Pulse: 71 (12/15/23 1319)  Resp: 17 (12/15/23 1319)  BP: 134/78 (12/15/23 1319)  SpO2: 95 %  "(12/15/23 9512)    Physical Exam:  : Ht: 6' 2" (188 cm)   Wt: 78 kg (172 lb)   BMI: 22.08 kg/m² .                                                       GENERAL:  Comfortable, in no acute distress.                                 HEENT EXAM:  Nonicteric.  No adenopathy.  Oropharynx is clear.               NECK:  Supple.                                                               LUNGS:  Clear.                                                               CARDIAC:  Regular rate and rhythm.  S1, S2.  No murmur.                      ABDOMEN:  Soft, positive bowel sounds, nontender.  No hepatosplenomegaly or masses.  No rebound or guarding.                                             EXTREMITIES:  No edema.     MENTAL STATUS:  Alert and oriented.    ASSESSMENT/PLAN:     Assessment: Fatigue.  Abdo pain.    Plan: Colonoscopy    Anesthesia Plan:   MAC / General Anaesthesia    ASA Grade: ASA 2 - Patient with mild systemic disease with no functional limitations    MALLAMPATI SCORE: I (soft palate, uvula, fauces, and tonsillar pillars visible)    "

## 2023-12-15 NOTE — ANESTHESIA POSTPROCEDURE EVALUATION
Anesthesia Post Evaluation    Patient: Hernan Echavarria    Procedure(s) Performed: Procedure(s) (LRB):  COLONOSCOPY (N/A)    Final Anesthesia Type: general      Patient location during evaluation: PACU  Patient participation: Yes- Able to Participate  Level of consciousness: awake and alert  Post-procedure vital signs: reviewed and stable  Pain management: adequate  Airway patency: patent    PONV status at discharge: No PONV  Anesthetic complications: no      Cardiovascular status: blood pressure returned to baseline  Respiratory status: unassisted  Hydration status: euvolemic  Follow-up not needed.              Vitals Value Taken Time   /72 12/15/23 1515   Temp  12/15/23 1532   Pulse 58 12/15/23 1515   Resp 14 12/15/23 1515   SpO2 99 % 12/15/23 1515         Event Time   Out of Recovery 15:21:32         Pain/Kaleb Score: Kaleb Score: 10 (12/15/2023  3:15 PM)

## 2023-12-15 NOTE — TRANSFER OF CARE
"Anesthesia Transfer of Care Note    Patient: Hernan Echavarria    Procedure(s) Performed: Procedure(s) (LRB):  COLONOSCOPY (N/A)    Patient location: PACU    Anesthesia Type: general    Transport from OR: Transported from OR on 2-3 L/min O2 by NC with adequate spontaneous ventilation    Post pain: adequate analgesia    Post assessment: no apparent anesthetic complications and tolerated procedure well    Post vital signs: stable    Level of consciousness: awake and responds to stimulation    Nausea/Vomiting: no nausea/vomiting    Complications: none    Transfer of care protocol was followed      Last vitals: Visit Vitals  /78 (BP Location: Right arm, Patient Position: Lying)   Pulse 71   Temp 36.4 °C (97.5 °F) (Temporal)   Resp 17   Ht 6' 2" (1.88 m)   Wt 78 kg (172 lb)   SpO2 95%   BMI 22.08 kg/m²     "

## 2023-12-15 NOTE — PROVATION PATIENT INSTRUCTIONS
Discharge Summary/Instructions for after Colonoscopy with   Biopsy/Polypectomy  Hernan Echavarria    Friday, December 15, 2023  Tremayne Torrez MD  RESTRICTIONS ON ACTIVITY:  - Do not drive a car or operate machinery until the day after the procedure.      - The following day: return to full activity including work.  - For  3 days: No heavy lifting, straining or running.  - Diet: You can have solid foods, but no gassy foods (i.e. beans, broccoli,   cabbage, etc).  TREATMENT FOR COMMON SIDE EFFECTS:  - Mild abdominal pain and bloating or excessive gas: rest, eat lightly and   use a heating pad.  SYMPTOMS TO WATCH FOR AND REPORT TO YOUR PHYSICIAN:  1. Severe abdominal pain.  2. Fever within 24 hours after a procedure.  3. A large amount of rectal bleeding. (A small amount of blood from the   rectum is not serious, especially if hemorrhoids are present.  3.  Because air was put into your colon during the procedure, expelling   large amounts of air from your rectum is normal.  4.  You may not have a bowel movement for 1-3 days because of the   colonoscopy prep.  This is normal.  5.  Call immediately if you notice any of the following:   Chills and/or fever over 101   Persistent vomiting   Severe abdominal pain, other than gas cramps   Severe chest pain   Black, tarry stools   Any bleeding - exceeding one tablespoon  Your doctor recommends these additional instructions:  We are waiting for your pathology results.   Eat a high fiber diet.   Take a fiber supplement, for example Citrucel, Fibercon, Konsyl or   Metamucil.   Continue your present medications.   Resume taking warfarin tomorrow at directed dose by mouth.    daily   adjustment per [facility].   Your physician has indicated that a repeat colonoscopy is not recommended   due to your current age (83 years or older).  None  If you have any questions or problems, please call your physician.  EMERGENCY PHONE NUMBER: [phone]  LAB RESULTS: Call in two (2)  weeks for lab results, [phone]  ___________________________________________  Nurse Signature  ___________________________________________  Patient/Designated Responsible Party Signature  Tremayne Torrez MD  12/15/2023 3:17:53 PM  This report has been verified and signed electronically.  Dear patient,  As a result of recent federal legislation (The Federal Cures Act), you may   receive lab or pathology results from your procedure in your [facility]   account before your physician is able to contact you. Your physician or   their representative will relay the results to you with their   recommendations at their soonest availability.  Thank you.  PROVATION

## 2023-12-15 NOTE — ANESTHESIA PREPROCEDURE EVALUATION
12/15/2023  Hernan Echavarria is a 83 y.o., male.      Pre-op Assessment    I have reviewed the Patient Summary Reports.     I have reviewed the Nursing Notes. I have reviewed the NPO Status.   I have reviewed the Medications.     Review of Systems  Anesthesia Hx:  No problems with previous Anesthesia                Cardiovascular:     Hypertension   CAD                  Coronary Artery Disease:                            Hypertension     Atrial Flutter     Renal/:  Chronic Renal Disease        Kidney Function/Disease             Hepatic/GI:     GERD      Gerd          Musculoskeletal:  Arthritis        Arthritis          Neurological:  TIA,  Neuromuscular Disease,           Arthritis               TIA - Transient Ischemic Attack             Neuromuscular Disease       Physical Exam  General: Well nourished    Airway:  Mallampati: II   Mouth Opening: Normal  TM Distance: Normal  Neck ROM: Normal ROM        Anesthesia Plan  Type of Anesthesia, risks & benefits discussed:    Anesthesia Type: Gen Natural Airway  Intra-op Monitoring Plan: Standard ASA Monitors  Induction:  IV  Informed Consent: Informed consent signed with the Patient and all parties understand the risks and agree with anesthesia plan.  All questions answered.   ASA Score: 3    Ready For Surgery From Anesthesia Perspective.     .

## 2023-12-18 ENCOUNTER — TELEPHONE (OUTPATIENT)
Dept: GASTROENTEROLOGY | Facility: CLINIC | Age: 83
End: 2023-12-18
Payer: MEDICARE

## 2023-12-18 PROBLEM — N18.9 CKD (CHRONIC KIDNEY DISEASE): Status: ACTIVE | Noted: 2023-12-18

## 2023-12-18 PROBLEM — K62.5 RECTAL BLEEDING: Status: ACTIVE | Noted: 2023-12-18

## 2023-12-18 PROBLEM — Z86.0100 HISTORY OF COLONIC POLYPS: Status: ACTIVE | Noted: 2023-12-18

## 2023-12-18 PROBLEM — Z86.010 HISTORY OF COLONIC POLYPS: Status: ACTIVE | Noted: 2023-12-18

## 2023-12-18 PROBLEM — D62 ACUTE BLOOD LOSS ANEMIA: Status: ACTIVE | Noted: 2023-12-18

## 2023-12-18 NOTE — TELEPHONE ENCOUNTER
----- Message from Dayana Hazel sent at 12/18/2023  8:06 AM CST -----  Type:  Needs Medical Advice    Who Called: pt    Symptoms (please be specific): blood in stool     How long has patient had these symptoms:  2 days    Pharmacy name and phone #:    [facility] [place] - [address]  [address]  [place] [address]  Phone: [phone] Fax: [phone]    Would the patient rather a call back or a response via [facility]? Call back    Best Call Back Number: [phone]      Additional Information: pt had colonoscopy done on Friday 12/15/2023 and says now he is passing blood with a BM      Please call Back to advise. Thanks!

## 2023-12-18 NOTE — TELEPHONE ENCOUNTER
----- Message from Aryan Muro sent at 12/18/2023  7:47 AM CST -----  Type: Needs Medical Advice  Who Called:  Patient  Symptoms (please be specific):  Rectal bleeding  How long has patient had these symptoms: 1 day Post colonoscopy    Best Call Back Number:  [phone]  Additional Information: Please call to advise

## 2023-12-18 NOTE — TELEPHONE ENCOUNTER
Called and spoke with patient, patient states that he started having rectal bleeding in his stool after he had his colonoscopy but now the bleeding is getting worse, patient denies any abdominal pain at this time, patient states that he is vey concerned about the bleeding as he has restarted his coumadin.  Message sent to Dr Torrez.  Patient was advised to go to the ER at [facility] for evaluation, patient verbalized understanding of this.

## 2023-12-20 LAB
FINAL PATHOLOGIC DIAGNOSIS: NORMAL
GROSS: NORMAL
Lab: NORMAL
MICROSCOPIC EXAM: NORMAL

## 2023-12-26 ENCOUNTER — TELEPHONE (OUTPATIENT)
Dept: GASTROENTEROLOGY | Facility: CLINIC | Age: 83
End: 2023-12-26
Payer: MEDICARE

## 2023-12-26 ENCOUNTER — TELEPHONE (OUTPATIENT)
Dept: NEPHROLOGY | Facility: CLINIC | Age: 83
End: 2023-12-26
Payer: MEDICARE

## 2023-12-26 NOTE — TELEPHONE ENCOUNTER
----- Message from Tremayne Torrez Jr., MD sent at 12/25/2023 10:36 PM CST -----  Please let patient know.  The polyps were all fine, benign adenomas.      Nothing to worry about.

## 2023-12-26 NOTE — TELEPHONE ENCOUNTER
----- Message from Pascale Valente sent at 12/26/2023  9:41 AM CST -----  Contact: self  Type: Needs Medical Advice  Who Called:  Patient    Best Call Back Number: [phone]    Additional Information: Pt states he would like to speak with office to see if his results are In.Please call back

## 2023-12-26 NOTE — TELEPHONE ENCOUNTER
Attempted to reach the patient, left message asking for a call back to the clinic, clinic number provided.    Per Tremayne Liriano Jr., MD [facility]  Please let patient know.  The polyps were all fine, benign adenomas.      Nothing to worry about.

## 2023-12-26 NOTE — TELEPHONE ENCOUNTER
I scheduled a RP for patient in January per  request. It will be the same day he goes to [facility] on the 12 th at 1 pm. I will try to call patient again after lunch.

## 2023-12-26 NOTE — TELEPHONE ENCOUNTER
----- Message from Theo Overton MD sent at 12/22/2023  2:21 PM CST -----  He ideally should repeat a rp in [place] please

## 2023-12-27 ENCOUNTER — TELEPHONE (OUTPATIENT)
Dept: GASTROENTEROLOGY | Facility: CLINIC | Age: 83
End: 2023-12-27
Payer: MEDICARE

## 2023-12-27 NOTE — TELEPHONE ENCOUNTER
Attempted to reach the patient, left message, clinic number provided.    Per Tremayne Liriano Jr., MD [facility]  Please let patient know.  The polyps were all fine, benign adenomas.      Nothing to worry about.

## 2023-12-28 ENCOUNTER — TELEPHONE (OUTPATIENT)
Dept: GASTROENTEROLOGY | Facility: CLINIC | Age: 83
End: 2023-12-28
Payer: MEDICARE

## 2024-01-11 ENCOUNTER — TELEPHONE (OUTPATIENT)
Dept: NEPHROLOGY | Facility: CLINIC | Age: 84
End: 2024-01-11

## 2024-01-11 NOTE — TELEPHONE ENCOUNTER
----- Message from Dayana Hazel sent at 1/11/2024  3:41 PM CST -----  Type:  Test Results      Who Called: pt    Name of Test (Lab/Mammo/Etc): lab    Date of Test: 1/8    Ordering Provider: boo    Where the test was performed: [facility]    Would the patient rather a call back or a response via [facility]? Call back    Best Call Back Number: [phone]      Additional Information:       Please call Back to advise. Thanks!

## 2024-01-11 NOTE — TELEPHONE ENCOUNTER
No new medications, no nsaids, stopping anything new and only taking meds that he has been taking for 20 years. ( Not taking anything new as of 1/11)    Pt. Will get labs at end of month .

## 2024-01-11 NOTE — TELEPHONE ENCOUNTER
Do a symptom check and let's get another renal panel on him by the end of the month please.  Any new meds?  Any NSAIDs?

## 2024-01-17 ENCOUNTER — TELEPHONE (OUTPATIENT)
Dept: NEPHROLOGY | Facility: CLINIC | Age: 84
End: 2024-01-17

## 2024-01-17 NOTE — TELEPHONE ENCOUNTER
I verified labs and follow up for . Addressed. Patient understood that he needed labs drawn at the end of this month.

## 2024-01-17 NOTE — TELEPHONE ENCOUNTER
----- Message from Edwige Lora sent at 1/17/2024  3:36 PM CST -----  Type: Needs Medical Advice  Who Called:  pt  Best Call Back Number: [phone]  Additional Information: pt states he was suppose to redue his labs for the dr at the end of this month but its not until April, he is requesting to speak with the nurse about his, pl call bk to advise thanks

## 2024-02-02 PROBLEM — Z95.2 H/O PROSTHETIC HEART VALVE: Status: ACTIVE | Noted: 2019-09-25

## 2024-02-02 PROBLEM — M17.12 PRIMARY OSTEOARTHRITIS OF LEFT KNEE: Status: ACTIVE | Noted: 2024-02-02

## 2024-02-02 PROBLEM — I65.29 CAROTID ARTERY STENOSIS: Status: ACTIVE | Noted: 2019-11-05

## 2024-02-02 PROBLEM — N20.0 KIDNEY STONES: Status: ACTIVE | Noted: 2024-02-02

## 2024-02-02 PROBLEM — R14.0 ABDOMINAL DISTENSION (GASEOUS): Status: ACTIVE | Noted: 2024-02-02

## 2024-02-02 PROBLEM — M79.2 NERVE PAIN: Status: ACTIVE | Noted: 2024-02-02

## 2024-02-02 PROBLEM — R13.14 DYSPHAGIA, PHARYNGOESOPHAGEAL PHASE: Status: ACTIVE | Noted: 2024-02-02

## 2024-02-02 PROBLEM — E80.6 HYPERBILIRUBINEMIA: Status: ACTIVE | Noted: 2024-02-02

## 2024-02-02 PROBLEM — R10.84 GENERALIZED ABDOMINAL PAIN: Status: ACTIVE | Noted: 2024-02-02

## 2024-02-02 PROBLEM — I35.1 NONRHEUMATIC AORTIC VALVE INSUFFICIENCY: Status: ACTIVE | Noted: 2019-09-15

## 2024-02-02 PROBLEM — R10.13 EPIGASTRIC PAIN: Status: ACTIVE | Noted: 2024-02-02

## 2024-02-02 PROBLEM — M51.36 LUMBAR DEGENERATIVE DISC DISEASE: Status: ACTIVE | Noted: 2024-02-02

## 2024-02-02 PROBLEM — K59.04 CHRONIC IDIOPATHIC CONSTIPATION: Status: ACTIVE | Noted: 2024-02-02

## 2024-02-02 PROBLEM — G56.03 BILATERAL CARPAL TUNNEL SYNDROME: Status: ACTIVE | Noted: 2024-02-02

## 2024-02-02 PROBLEM — H26.9 UNSPECIFIED CATARACT: Status: ACTIVE | Noted: 2024-02-02

## 2024-02-02 PROBLEM — Z95.2 HISTORY OF HEART VALVE REPLACEMENT: Status: ACTIVE | Noted: 2017-09-05

## 2024-02-02 PROBLEM — D12.6 BENIGN NEOPLASM OF COLON: Status: ACTIVE | Noted: 2024-02-02

## 2024-02-02 PROBLEM — M51.369 LUMBAR DEGENERATIVE DISC DISEASE: Status: ACTIVE | Noted: 2024-02-02

## 2024-02-02 PROBLEM — N40.0 BPH (BENIGN PROSTATIC HYPERPLASIA): Status: ACTIVE | Noted: 2024-02-02

## 2024-02-02 PROBLEM — E78.00 PURE HYPERCHOLESTEROLEMIA: Status: ACTIVE | Noted: 2017-09-05

## 2024-02-02 PROBLEM — R06.83 SNORES: Status: ACTIVE | Noted: 2024-02-02

## 2024-02-02 PROBLEM — I48.91 ATRIAL FIBRILLATION: Status: ACTIVE | Noted: 2019-09-16

## 2024-02-21 ENCOUNTER — TELEPHONE (OUTPATIENT)
Dept: NEPHROLOGY | Facility: CLINIC | Age: 84
End: 2024-02-21

## 2024-02-21 NOTE — TELEPHONE ENCOUNTER
----- Message from Di Kerns sent at 2/21/2024  8:39 AM CST -----  Contact: self  Caller is requesting to schedule their Lab appointment prior to annual appointment.  Order is not listed in EPIC.  Please enter order and contact patient to schedule.    Name of Caller: Pt  Preferred Date and Time of Labs: Open  Date of E[facility] Appointment: 2/22  Where would they like the lab performed?  Open  Would the patient rather a call back or a response via [facility]? call  Best Call Back Number: [phone]  Additional Information: Pt is requesting lab orders be put in the system, pt has an appt 2/22  Please call pt once labs are in the system and ready for scheduling... Thank you...

## 2024-02-22 ENCOUNTER — OFFICE VISIT (OUTPATIENT)
Dept: NEPHROLOGY | Facility: CLINIC | Age: 84
End: 2024-02-22
Payer: MEDICARE

## 2024-02-22 VITALS
WEIGHT: 170 LBS | HEIGHT: 74 IN | HEART RATE: 96 BPM | SYSTOLIC BLOOD PRESSURE: 118 MMHG | BODY MASS INDEX: 21.82 KG/M2 | OXYGEN SATURATION: 98 % | DIASTOLIC BLOOD PRESSURE: 72 MMHG

## 2024-02-22 DIAGNOSIS — N25.81 SECONDARY RENAL HYPERPARATHYROIDISM: ICD-10-CM

## 2024-02-22 DIAGNOSIS — N18.32 CHRONIC KIDNEY DISEASE, STAGE 3B: Primary | ICD-10-CM

## 2024-02-22 DIAGNOSIS — I10 PRIMARY HYPERTENSION: ICD-10-CM

## 2024-02-22 PROCEDURE — 3074F SYST BP LT 130 MM HG: CPT | Mod: CPTII,S$GLB,, | Performed by: INTERNAL MEDICINE

## 2024-02-22 PROCEDURE — 1160F RVW MEDS BY RX/DR IN RCRD: CPT | Mod: CPTII,S$GLB,, | Performed by: INTERNAL MEDICINE

## 2024-02-22 PROCEDURE — 99999 PR PBB SHADOW E&M-EST. PATIENT-LVL III: CPT | Mod: PBBFAC,,, | Performed by: INTERNAL MEDICINE

## 2024-02-22 PROCEDURE — 1101F PT FALLS ASSESS-DOCD LE1/YR: CPT | Mod: CPTII,S$GLB,, | Performed by: INTERNAL MEDICINE

## 2024-02-22 PROCEDURE — 99214 OFFICE O/P EST MOD 30 MIN: CPT | Mod: S$GLB,,, | Performed by: INTERNAL MEDICINE

## 2024-02-22 PROCEDURE — 1159F MED LIST DOCD IN RCRD: CPT | Mod: CPTII,S$GLB,, | Performed by: INTERNAL MEDICINE

## 2024-02-22 PROCEDURE — 3078F DIAST BP <80 MM HG: CPT | Mod: CPTII,S$GLB,, | Performed by: INTERNAL MEDICINE

## 2024-02-22 PROCEDURE — 3288F FALL RISK ASSESSMENT DOCD: CPT | Mod: CPTII,S$GLB,, | Performed by: INTERNAL MEDICINE

## 2024-02-22 NOTE — PROGRESS NOTES
"      Subjective:       Patient ID: Hernan Echavarria is a 83 y.o. Black or  male who presents for return patient evaluation for chronic renal failure.      He reports he has itchy skin chronically.  He also reports he was started on farxiga but did not continue it due to cost.  He has had more fatigue for the past 2 months ago.  He denies frequent NSAIDs.  He has been more fatigued and has intermittent RUQ pain over the past two-four weeks.  He has been having problems with constipation.      Review of Systems   Constitutional:  Positive for activity change and fatigue. Negative for appetite change, chills and fever.   HENT:  Negative for congestion.    Eyes:  Negative for visual disturbance.   Respiratory:  Negative for cough and shortness of breath.    Cardiovascular:  Negative for chest pain and leg swelling.   Gastrointestinal:  Positive for abdominal distention, abdominal pain and constipation. Negative for diarrhea, nausea and vomiting.   Genitourinary:  Negative for difficulty urinating, dysuria and hematuria.   Musculoskeletal:  Negative for myalgias.   Skin:  Negative for rash.        itchy   Neurological:  Negative for headaches.   Psychiatric/Behavioral:  Negative for sleep disturbance.        The past medical, family and social histories were reviewed for this encounter.     /72 (BP Location: Left arm, Patient Position: Sitting)   Pulse 96   Ht 6' 2" (1.88 m)   Wt 77.1 kg (170 lb)   SpO2 98%   BMI 21.83 kg/m²     Objective:      Physical Exam  Vitals reviewed.   Constitutional:       General: He is not in acute distress.     Appearance: He is well-developed.   HENT:      Head: Normocephalic and atraumatic.   Eyes:      General: No scleral icterus.     Conjunctiva/sclera: Conjunctivae normal.   Neck:      Vascular: No JVD.   Cardiovascular:      Rate and Rhythm: Normal rate and regular rhythm.      Heart sounds: Normal heart sounds. No murmur heard.     No friction rub. No gallop. "      Comments: Mechanical click  Pulmonary:      Effort: Pulmonary effort is normal. No respiratory distress.      Breath sounds: Normal breath sounds. No wheezing.   Abdominal:      General: Bowel sounds are normal. There is distension.      Palpations: Abdomen is soft.      Tenderness: There is no abdominal tenderness.   Musculoskeletal:      Cervical back: Normal range of motion.      Right lower leg: No edema.      Left lower leg: No edema.   Skin:     General: Skin is warm and dry.      Findings: No rash.   Neurological:      Mental Status: He is alert and oriented to person, place, and time.   Psychiatric:         Mood and Affect: Mood normal.         Behavior: Behavior normal.         Assessment:       1. Chronic kidney disease, stage 3b    2. Primary hypertension    3. Secondary renal hyperparathyroidism        Lab Results   Component Value Date    CREATININE 1.80 (H) 02/21/2024    BUN 29 (H) 02/21/2024     02/21/2024    K 4.8 02/21/2024     02/21/2024    CO2 29 02/21/2024     Lab Results   Component Value Date    PTH 82.9 (H) 02/21/2024    CALCIUM 9.4 02/21/2024    PHOS 4.0 02/21/2024     Lab Results   Component Value Date    HCT 34.6 (L) 02/02/2024     Prot/Creat Ratio, Urine   Date Value Ref Range Status   02/21/2024 280.5 (H) 15.0 - 68.0 mg/g Final   01/25/2024 212.8 (H) 15.0 - 68.0 mg/g Final   01/08/2024 111.8 (H) 15.0 - 68.0 mg/g Final     Plan:   Return to clinic in 4 months.  Labs for next visit include rp pth upc per standing orders.  UACR for next time.  Baseline creatinine is 1.4-1.7 since 2015.  PTH is 83 with a calcium of 9.4.  Renal US shows R 10.4 cm L 10.8 cm.  UPC is 0.28.  Change US from retroperitoneal to full abdominal US  Given that his Scr is stable over the past 5 years and his urine is negative, I do not suspect he has a kidney reason to explain his change in symptoms.  He had a normal CT of kidneys last summer thus I do not suspect any new structural abnormalities.  His  eosinophils are up a bit but this is very nonspecific.    Computed KFRE 2-Year unavailable. Necessary lab results were not found in the last year.    Computed KFRE 5-Year unavailable. Necessary lab results were not found in the last year.

## 2024-03-01 ENCOUNTER — TELEPHONE (OUTPATIENT)
Dept: NEPHROLOGY | Facility: CLINIC | Age: 84
End: 2024-03-01
Payer: MEDICARE

## 2024-03-01 NOTE — TELEPHONE ENCOUNTER
----- Message from Sridhar Cohen sent at 3/1/2024 11:05 AM CST -----  Regarding: Test Results  Type:  Test Results    Who Called: Pt    Name of Test (Lab/Mammo/Etc):     Date of Test: 2/29    Ordering Provider: Tian    Where the test was performed: [facility]    Would the patient rather a call back or a response via [facility]? Call back    Best Call Back Number: [phone]      Additional Information:  Wants to know if his results came in does not use the portal.      Please advise -- Thank you

## 2024-03-01 NOTE — TELEPHONE ENCOUNTER
Kidney function is improved.  Urine is normal.  Ultrasound showed no masses or blockages in the kidneys.    Overall things look better.

## 2024-03-18 PROBLEM — K62.5 RECTAL BLEEDING: Status: RESOLVED | Noted: 2023-12-18 | Resolved: 2024-03-18

## 2024-03-21 PROBLEM — I42.9 CARDIOMYOPATHY, UNSPECIFIED: Status: ACTIVE | Noted: 2024-03-21

## 2024-03-21 PROBLEM — N25.81 SECONDARY HYPERPARATHYROIDISM OF RENAL ORIGIN: Status: ACTIVE | Noted: 2024-03-21

## 2024-03-21 PROBLEM — I20.9 ANGINA PECTORIS, UNSPECIFIED: Status: ACTIVE | Noted: 2024-03-21

## 2024-04-19 PROBLEM — K58.1 IRRITABLE BOWEL SYNDROME WITH CONSTIPATION: Status: ACTIVE | Noted: 2024-04-19

## 2024-05-21 PROBLEM — R06.09 DOE (DYSPNEA ON EXERTION): Status: ACTIVE | Noted: 2024-05-21

## 2024-06-12 PROBLEM — D69.6 THROMBOCYTOPENIA, UNSPECIFIED: Status: ACTIVE | Noted: 2024-06-12

## 2024-06-12 PROBLEM — I27.20 PULMONARY HYPERTENSION, UNSPECIFIED: Status: ACTIVE | Noted: 2024-06-12

## 2024-06-27 ENCOUNTER — TELEPHONE (OUTPATIENT)
Dept: NEPHROLOGY | Facility: CLINIC | Age: 84
End: 2024-06-27
Payer: MEDICARE

## 2024-06-27 ENCOUNTER — OFFICE VISIT (OUTPATIENT)
Dept: NEPHROLOGY | Facility: CLINIC | Age: 84
End: 2024-06-27
Payer: MEDICARE

## 2024-06-27 VITALS
HEIGHT: 74 IN | OXYGEN SATURATION: 97 % | WEIGHT: 167.31 LBS | BODY MASS INDEX: 21.47 KG/M2 | SYSTOLIC BLOOD PRESSURE: 130 MMHG | HEART RATE: 82 BPM | DIASTOLIC BLOOD PRESSURE: 84 MMHG

## 2024-06-27 DIAGNOSIS — N25.81 SECONDARY RENAL HYPERPARATHYROIDISM: ICD-10-CM

## 2024-06-27 DIAGNOSIS — I10 PRIMARY HYPERTENSION: ICD-10-CM

## 2024-06-27 DIAGNOSIS — N18.32 CHRONIC KIDNEY DISEASE, STAGE 3B: Primary | ICD-10-CM

## 2024-06-27 PROCEDURE — 1160F RVW MEDS BY RX/DR IN RCRD: CPT | Mod: CPTII,S$GLB,, | Performed by: INTERNAL MEDICINE

## 2024-06-27 PROCEDURE — 99999 PR PBB SHADOW E&M-EST. PATIENT-LVL III: CPT | Mod: PBBFAC,,, | Performed by: INTERNAL MEDICINE

## 2024-06-27 PROCEDURE — 3079F DIAST BP 80-89 MM HG: CPT | Mod: CPTII,S$GLB,, | Performed by: INTERNAL MEDICINE

## 2024-06-27 PROCEDURE — 99214 OFFICE O/P EST MOD 30 MIN: CPT | Mod: S$GLB,,, | Performed by: INTERNAL MEDICINE

## 2024-06-27 PROCEDURE — 1159F MED LIST DOCD IN RCRD: CPT | Mod: CPTII,S$GLB,, | Performed by: INTERNAL MEDICINE

## 2024-06-27 PROCEDURE — 3288F FALL RISK ASSESSMENT DOCD: CPT | Mod: CPTII,S$GLB,, | Performed by: INTERNAL MEDICINE

## 2024-06-27 PROCEDURE — 3075F SYST BP GE 130 - 139MM HG: CPT | Mod: CPTII,S$GLB,, | Performed by: INTERNAL MEDICINE

## 2024-06-27 PROCEDURE — 1101F PT FALLS ASSESS-DOCD LE1/YR: CPT | Mod: CPTII,S$GLB,, | Performed by: INTERNAL MEDICINE

## 2024-06-27 NOTE — PROGRESS NOTES
"  Subjective:       Patient ID: Hernan Echavarria is a 83 y.o. Black or  male who presents for return patient evaluation for chronic renal failure.      He reports he has itchy skin chronically.  He also reports he was started on farxiga but did not continue it due to cost.  He has had nausea for several months ago - he sees Dr. Gandhi.  He denies frequent NSAIDs.        Review of Systems   Constitutional:  Positive for activity change and fatigue. Negative for appetite change, chills and fever.   HENT:  Negative for congestion.    Eyes:  Negative for visual disturbance.   Respiratory:  Negative for cough and shortness of breath.    Cardiovascular:  Negative for chest pain and leg swelling.   Gastrointestinal:  Positive for abdominal distention, abdominal pain, constipation and nausea. Negative for diarrhea and vomiting.   Genitourinary:  Negative for difficulty urinating, dysuria and hematuria.   Musculoskeletal:  Negative for myalgias.   Skin:  Negative for rash.        itchy   Neurological:  Negative for headaches.   Psychiatric/Behavioral:  Negative for sleep disturbance.        The past medical, family and social histories were reviewed for this encounter.     /84 (BP Location: Right arm, Patient Position: Sitting)   Pulse 82   Ht 6' 2" (1.88 m)   Wt 75.9 kg (167 lb 5.3 oz)   SpO2 97%   BMI 21.48 kg/m²     Objective:      Physical Exam  Vitals reviewed.   Constitutional:       General: He is not in acute distress.     Appearance: He is well-developed.   HENT:      Head: Normocephalic and atraumatic.   Eyes:      General: No scleral icterus.     Conjunctiva/sclera: Conjunctivae normal.   Neck:      Vascular: No JVD.   Cardiovascular:      Rate and Rhythm: Normal rate and regular rhythm.      Heart sounds: Normal heart sounds. No murmur heard.     No friction rub. No gallop.      Comments: Mechanical click  Pulmonary:      Effort: Pulmonary effort is normal. No respiratory distress.      " "Breath sounds: Normal breath sounds. No wheezing.   Abdominal:      General: Bowel sounds are normal. There is distension.      Palpations: Abdomen is soft.      Tenderness: There is no abdominal tenderness.   Musculoskeletal:      Cervical back: Normal range of motion.      Right lower leg: No edema.      Left lower leg: No edema.   Skin:     General: Skin is warm and dry.      Findings: No rash.   Neurological:      Mental Status: He is alert and oriented to person, place, and time.   Psychiatric:         Mood and Affect: Mood normal.         Behavior: Behavior normal.         Assessment:       1. Chronic kidney disease, stage 3b    2. Primary hypertension    3. Secondary renal hyperparathyroidism        Lab Results   Component Value Date    CREATININE 2.12 (H) 03/18/2024    BUN 30 (H) 03/18/2024     03/18/2024    K 4.6 03/18/2024     03/18/2024    CO2 28 03/18/2024     Lab Results   Component Value Date    PTH 82.9 (H) 02/21/2024    CALCIUM 9.5 03/18/2024    PHOS 4.0 02/21/2024     Lab Results   Component Value Date    HCT 39.5 (L) 04/22/2024     Prot/Creat Ratio, Urine   Date Value Ref Range Status   02/21/2024 280.5 (H) 15.0 - 68.0 mg/g Final   01/25/2024 212.8 (H) 15.0 - 68.0 mg/g Final   01/08/2024 111.8 (H) 15.0 - 68.0 mg/g Final     Plan:   Return to clinic in 6 months.  Labs for next visit include rp pth upc per standing orders q 3 months.  UACR for next time.  Baseline creatinine is 1.5-1.8 since 2015.  He has been more labile since he started having "gastroparesis".  PTH is 83 with a calcium of 9.4.  Renal US shows R 10.4 cm L 10.8 cm.  UPC is 0.28.  Given that his Scr is stable over the past 5 years and his urine is negative, I do not suspect he has a kidney reason to explain his change in symptoms.  He had a normal CT of kidneys last summer thus I do not suspect any new structural abnormalities.  His eosinophils are up a bit but this is very nonspecific.    Computed KFRE 2-Year unavailable. " One or more values for this score either were not found within the given timeframe or did not fit some other criterion.    Computed KFRE 5-Year unavailable. One or more values for this score either were not found within the given timeframe or did not fit some other criterion.

## 2024-08-29 ENCOUNTER — TELEPHONE (OUTPATIENT)
Dept: NEPHROLOGY | Facility: CLINIC | Age: 84
End: 2024-08-29
Payer: MEDICARE

## 2024-08-29 NOTE — TELEPHONE ENCOUNTER
----- Message from Addie Booker sent at 8/29/2024  8:11 AM CDT -----  Contact: PT  Type:  Needs Medical Advice    Who Called: PT  Symptoms (please be specific): PLEASE CALL TO DISCUSS NEXT APPT AND LABS     Would the patient rather a call back or a response via [facility]? CALL   Best Call Back Number: [phone] (home)     Additional Information: THANK YOU

## 2024-09-17 PROBLEM — I50.43 ACUTE ON CHRONIC COMBINED SYSTOLIC AND DIASTOLIC HEART FAILURE: Status: ACTIVE | Noted: 2023-03-20

## 2024-09-17 PROBLEM — I50.23 ACUTE ON CHRONIC SYSTOLIC CHF (CONGESTIVE HEART FAILURE): Status: RESOLVED | Noted: 2024-09-17 | Resolved: 2024-09-17

## 2024-09-17 PROBLEM — I50.23 ACUTE ON CHRONIC SYSTOLIC CHF (CONGESTIVE HEART FAILURE): Status: ACTIVE | Noted: 2024-09-17

## 2024-09-17 PROBLEM — R79.89 ELEVATED TROPONIN: Status: ACTIVE | Noted: 2024-09-17

## 2024-09-19 ENCOUNTER — TELEPHONE (OUTPATIENT)
Dept: NEPHROLOGY | Facility: CLINIC | Age: 84
End: 2024-09-19
Payer: MEDICARE

## 2024-09-19 PROBLEM — N18.4 CKD (CHRONIC KIDNEY DISEASE) STAGE 4, GFR 15-29 ML/MIN: Status: ACTIVE | Noted: 2023-12-18

## 2024-09-19 NOTE — TELEPHONE ENCOUNTER
Called and spoke to Pt. Scheduled 3 week hospital follow-up per Dr. Overton orders. 1 week follow up Renal Panel scheduled. Pt. Verbalized understanding.

## 2024-10-10 ENCOUNTER — OFFICE VISIT (OUTPATIENT)
Dept: NEPHROLOGY | Facility: CLINIC | Age: 84
End: 2024-10-10
Payer: MEDICARE

## 2024-10-10 ENCOUNTER — TELEPHONE (OUTPATIENT)
Dept: NEPHROLOGY | Facility: CLINIC | Age: 84
End: 2024-10-10

## 2024-10-10 VITALS
DIASTOLIC BLOOD PRESSURE: 70 MMHG | BODY MASS INDEX: 20.14 KG/M2 | HEIGHT: 74 IN | SYSTOLIC BLOOD PRESSURE: 112 MMHG | HEART RATE: 79 BPM | OXYGEN SATURATION: 97 % | WEIGHT: 156.94 LBS

## 2024-10-10 DIAGNOSIS — N17.9 AKI (ACUTE KIDNEY INJURY): ICD-10-CM

## 2024-10-10 DIAGNOSIS — N25.81 SECONDARY RENAL HYPERPARATHYROIDISM: ICD-10-CM

## 2024-10-10 DIAGNOSIS — N18.32 CHRONIC KIDNEY DISEASE, STAGE 3B: Primary | ICD-10-CM

## 2024-10-10 DIAGNOSIS — I10 PRIMARY HYPERTENSION: ICD-10-CM

## 2024-10-10 PROCEDURE — 1160F RVW MEDS BY RX/DR IN RCRD: CPT | Mod: CPTII,S$GLB,, | Performed by: INTERNAL MEDICINE

## 2024-10-10 PROCEDURE — 3078F DIAST BP <80 MM HG: CPT | Mod: CPTII,S$GLB,, | Performed by: INTERNAL MEDICINE

## 2024-10-10 PROCEDURE — 99214 OFFICE O/P EST MOD 30 MIN: CPT | Mod: S$GLB,,, | Performed by: INTERNAL MEDICINE

## 2024-10-10 PROCEDURE — 99999 PR PBB SHADOW E&M-EST. PATIENT-LVL IV: CPT | Mod: PBBFAC,,, | Performed by: INTERNAL MEDICINE

## 2024-10-10 PROCEDURE — 1111F DSCHRG MED/CURRENT MED MERGE: CPT | Mod: CPTII,S$GLB,, | Performed by: INTERNAL MEDICINE

## 2024-10-10 PROCEDURE — 1157F ADVNC CARE PLAN IN RCRD: CPT | Mod: CPTII,S$GLB,, | Performed by: INTERNAL MEDICINE

## 2024-10-10 PROCEDURE — 3074F SYST BP LT 130 MM HG: CPT | Mod: CPTII,S$GLB,, | Performed by: INTERNAL MEDICINE

## 2024-10-10 PROCEDURE — 1159F MED LIST DOCD IN RCRD: CPT | Mod: CPTII,S$GLB,, | Performed by: INTERNAL MEDICINE

## 2024-10-10 RX ORDER — FUROSEMIDE 20 MG/1
20 TABLET ORAL 2 TIMES DAILY
COMMUNITY

## 2024-10-10 NOTE — PROGRESS NOTES
"  Subjective:       Patient ID: Hernan Echavarria is a 84 y.o. Black or  male who presents for return patient evaluation for chronic renal failure.      He had a recent [facility] admit.  He responded well to lasix and has no edema now.  He is feeling better with no chest pain.      Review of Systems   Constitutional:  Negative for chills, diaphoresis and fever.   Respiratory:  Negative for cough and shortness of breath.    Cardiovascular:  Negative for chest pain and leg swelling.   Gastrointestinal:  Negative for abdominal pain, diarrhea, nausea and vomiting.   Genitourinary:  Negative for difficulty urinating, dysuria and hematuria.   Musculoskeletal:  Negative for myalgias.   Neurological:  Negative for headaches.   Hematological:  Does not bruise/bleed easily.       The past medical, family and social histories were reviewed for this encounter.     /70   Pulse 79   Ht 6' 2" (1.88 m)   Wt 71.2 kg (156 lb 15.5 oz)   SpO2 97%   BMI 20.15 kg/m²     Objective:      Physical Exam  Vitals reviewed.   Constitutional:       General: He is not in acute distress.     Appearance: He is well-developed.   HENT:      Head: Normocephalic and atraumatic.   Eyes:      General: No scleral icterus.     Conjunctiva/sclera: Conjunctivae normal.   Neck:      Vascular: No JVD.   Cardiovascular:      Rate and Rhythm: Normal rate and regular rhythm.      Heart sounds: Normal heart sounds. No murmur heard.     No friction rub. No gallop.      Comments: Mechanical click  Pulmonary:      Effort: Pulmonary effort is normal. No respiratory distress.      Breath sounds: Normal breath sounds. No wheezing.   Abdominal:      General: Bowel sounds are normal. There is no distension.      Palpations: Abdomen is soft.      Tenderness: There is no abdominal tenderness.   Musculoskeletal:      Cervical back: Normal range of motion.      Right lower leg: No edema.      Left lower leg: No edema.   Skin:     General: Skin is warm and " "dry.      Findings: No rash.   Neurological:      Mental Status: He is alert and oriented to person, place, and time.   Psychiatric:         Mood and Affect: Mood normal.         Behavior: Behavior normal.         Assessment:       1. Chronic kidney disease, stage 3b    2. AME (acute kidney injury)    3. Primary hypertension    4. Secondary renal hyperparathyroidism        Lab Results   Component Value Date    CREATININE 2.67 (H) 09/25/2024    CREATININE 2.67 (H) 09/25/2024    BUN 65 (H) 09/25/2024    BUN 65 (H) 09/25/2024     09/25/2024     09/25/2024    K 4.1 09/25/2024    K 4.1 09/25/2024     09/25/2024     09/25/2024    CO2 26 09/25/2024    CO2 26 09/25/2024     Lab Results   Component Value Date    .1 (H) 09/25/2024    .1 (H) 09/25/2024    CALCIUM 9.0 09/25/2024    CALCIUM 9.0 09/25/2024    PHOS 2.8 09/25/2024    PHOS 2.8 09/25/2024     Lab Results   Component Value Date    HCT 38.8 (L) 09/16/2024     Prot/Creat Ratio, Urine   Date Value Ref Range Status   09/25/2024 204.7 (H) 15.0 - 68.0 mg/g Final   09/25/2024 204.7 (H) 15.0 - 68.0 mg/g Final   09/10/2024 1110.0 (H) 15.0 - 68.0 mg/g Final     Plan:   Return to clinic in 3 months.  Labs for next visit include rp pth upc per standing orders q 3 months.    Baseline creatinine is 1.5-1.8 since 2015.  He has been more labile since he started having "gastroparesis".  PTH is 191 with a calcium of 9.0.  Renal US shows R 10.4 cm L 10.8 cm.  UPC is 0.2.  His function has been labile given his combined CHF and pulmonary HTN.  Use lasix prn edema.  His target weight is 160 pounds.    KFRE 2-Year: 6.3% at 9/25/2024 10:22 AM  Calculated from:  Serum Creatinine: 2.67 mg/dL at 9/25/2024 10:19 AM  Urine Albumin Creatinine Ratio: 88.5 ug/mg at 9/25/2024 10:22 AM  Age: 84 years  Sex: Male at 9/25/2024 10:22 AM  Has CKD-3 to CKD-5: Yes    KFRE 5-Year: 18.5% at 9/25/2024 10:22 AM  Calculated from:  Serum Creatinine: 2.67 mg/dL at 9/25/2024 " 10:19 AM  Urine Albumin Creatinine Ratio: 88.5 ug/mg at 9/25/2024 10:22 AM  Age: 84 years  Sex: Male at 9/25/2024 10:22 AM  Has CKD-3 to CKD-5: Yes

## 2024-10-17 ENCOUNTER — TELEPHONE (OUTPATIENT)
Dept: NEPHROLOGY | Facility: CLINIC | Age: 84
End: 2024-10-17
Payer: MEDICARE

## 2024-10-17 NOTE — TELEPHONE ENCOUNTER
Received a call from [facility] lab inquiring over which labs to complete for Dr. Overton. Informed lab staff that the standing orders per Dr. Overton are: pth, rp, upc. They verbalized understanding.

## 2024-11-07 PROBLEM — I50.9 CHF (NYHA CLASS II, ACC/AHA STAGE C): Status: ACTIVE | Noted: 2024-11-07

## 2024-11-07 PROBLEM — I50.20 HEART FAILURE WITH REDUCED EJECTION FRACTION: Status: ACTIVE | Noted: 2024-11-07

## 2025-01-08 ENCOUNTER — OFFICE VISIT (OUTPATIENT)
Dept: NEPHROLOGY | Facility: CLINIC | Age: 85
End: 2025-01-08
Payer: MEDICARE

## 2025-01-08 VITALS
OXYGEN SATURATION: 98 % | WEIGHT: 162.06 LBS | BODY MASS INDEX: 20.8 KG/M2 | DIASTOLIC BLOOD PRESSURE: 70 MMHG | HEART RATE: 75 BPM | SYSTOLIC BLOOD PRESSURE: 118 MMHG | HEIGHT: 74 IN

## 2025-01-08 DIAGNOSIS — N18.4 CKD (CHRONIC KIDNEY DISEASE) STAGE 4, GFR 15-29 ML/MIN: Primary | ICD-10-CM

## 2025-01-08 DIAGNOSIS — I10 PRIMARY HYPERTENSION: ICD-10-CM

## 2025-01-08 DIAGNOSIS — N25.81 SECONDARY RENAL HYPERPARATHYROIDISM: ICD-10-CM

## 2025-01-08 PROCEDURE — 3078F DIAST BP <80 MM HG: CPT | Mod: CPTII,S$GLB,, | Performed by: INTERNAL MEDICINE

## 2025-01-08 PROCEDURE — 1160F RVW MEDS BY RX/DR IN RCRD: CPT | Mod: CPTII,S$GLB,, | Performed by: INTERNAL MEDICINE

## 2025-01-08 PROCEDURE — 99214 OFFICE O/P EST MOD 30 MIN: CPT | Mod: S$GLB,,, | Performed by: INTERNAL MEDICINE

## 2025-01-08 PROCEDURE — 3074F SYST BP LT 130 MM HG: CPT | Mod: CPTII,S$GLB,, | Performed by: INTERNAL MEDICINE

## 2025-01-08 PROCEDURE — 1159F MED LIST DOCD IN RCRD: CPT | Mod: CPTII,S$GLB,, | Performed by: INTERNAL MEDICINE

## 2025-01-08 PROCEDURE — 99999 PR PBB SHADOW E&M-EST. PATIENT-LVL III: CPT | Mod: PBBFAC,,, | Performed by: INTERNAL MEDICINE

## 2025-01-08 PROCEDURE — 1157F ADVNC CARE PLAN IN RCRD: CPT | Mod: CPTII,S$GLB,, | Performed by: INTERNAL MEDICINE

## 2025-01-08 NOTE — PROGRESS NOTES
"  Subjective:       Patient ID: Hernan Echavarria is a 84 y.o. Black or  male who presents for return patient evaluation for chronic renal failure.      He is not having as much gastroparesis.  He responded well to lasix and has no edema now.  He is feeling better with no chest pain.      Review of Systems   Constitutional:  Negative for chills, diaphoresis and fever.   Respiratory:  Negative for cough and shortness of breath.    Cardiovascular:  Negative for chest pain and leg swelling.   Gastrointestinal:  Negative for abdominal pain, diarrhea, nausea and vomiting.   Genitourinary:  Negative for difficulty urinating, dysuria and hematuria.   Musculoskeletal:  Negative for myalgias.   Neurological:  Negative for headaches.   Hematological:  Does not bruise/bleed easily.       The past medical, family and social histories were reviewed for this encounter.     /70 (BP Location: Left arm, Patient Position: Sitting)   Pulse 75   Ht 6' 2" (1.88 m)   Wt 73.5 kg (162 lb 0.6 oz)   SpO2 98%   BMI 20.80 kg/m²     Objective:      Physical Exam  Vitals reviewed.   Constitutional:       General: He is not in acute distress.     Appearance: He is well-developed.   HENT:      Head: Normocephalic and atraumatic.   Eyes:      General: No scleral icterus.     Conjunctiva/sclera: Conjunctivae normal.   Neck:      Vascular: No JVD.   Cardiovascular:      Rate and Rhythm: Normal rate and regular rhythm.      Heart sounds: Normal heart sounds. No murmur heard.     No friction rub. No gallop.      Comments: Mechanical click  Pulmonary:      Effort: Pulmonary effort is normal. No respiratory distress.      Breath sounds: Normal breath sounds. No wheezing.   Abdominal:      General: Bowel sounds are normal. There is no distension.      Palpations: Abdomen is soft.      Tenderness: There is no abdominal tenderness.   Musculoskeletal:      Cervical back: Normal range of motion.      Right lower leg: No edema.      " "Left lower leg: No edema.   Skin:     General: Skin is warm and dry.      Findings: No rash.   Neurological:      Mental Status: He is alert and oriented to person, place, and time.   Psychiatric:         Mood and Affect: Mood normal.         Behavior: Behavior normal.         Assessment:       1. CKD (chronic kidney disease) stage 4, GFR 15-29 ml/min    2. Primary hypertension    3. Secondary renal hyperparathyroidism        Lab Results   Component Value Date    CREATININE 2.55 (H) 10/17/2024    BUN 48 (H) 10/17/2024     10/17/2024    K 4.2 10/17/2024     10/17/2024    CO2 29 10/17/2024     Lab Results   Component Value Date    .4 (H) 10/17/2024    CALCIUM 9.6 10/17/2024    PHOS 3.2 10/17/2024     Lab Results   Component Value Date    HCT 40.3 10/16/2024     Prot/Creat Ratio, Urine   Date Value Ref Range Status   12/31/2024 0.1 0.0 - 0.2 mg/g Final   10/17/2024 264.6 (H) 15.0 - 68.0 mg/g Final   09/25/2024 204.7 (H) 15.0 - 68.0 mg/g Final   09/25/2024 204.7 (H) 15.0 - 68.0 mg/g Final     Plan:   Return to clinic in 3 months.  Labs for next visit include rp pth upc per standing orders q 3 months.    Baseline creatinine is 1.5-1.8 since 2015.  He has been more labile since he started having "gastroparesis" but this seems to have settled down now.  PTH is 122 with a calcium of 9.6.  Renal US shows R 10.4 cm L 10.8 cm.  UPC is 0.1.  His function has been labile given his combined CHF and pulmonary HTN.  Use lasix prn edema.  His target weight is 160 pounds.  I will review his Dec labs from Dr. Bradley.    KFRE 2-Year: 5.7% at 10/17/2024  9:47 AM  Calculated from:  Serum Creatinine: 2.55 mg/dL at 10/17/2024  9:47 AM  Urine Albumin Creatinine Ratio: 88.5 ug/mg at 9/25/2024 10:22 AM  Age: 84 years  Sex: Male at 10/17/2024  9:47 AM  Has CKD-3 to CKD-5: Yes    KFRE 5-Year: 16.7% at 10/17/2024  9:47 AM  Calculated from:  Serum Creatinine: 2.55 mg/dL at 10/17/2024  9:47 AM  Urine Albumin Creatinine Ratio: " 88.5 ug/mg at 9/25/2024 10:22 AM  Age: 84 years  Sex: Male at 10/17/2024  9:47 AM  Has CKD-3 to CKD-5: Yes

## 2025-04-09 ENCOUNTER — RESULTS FOLLOW-UP (OUTPATIENT)
Dept: NEPHROLOGY | Facility: CLINIC | Age: 85
End: 2025-04-09

## 2025-04-25 ENCOUNTER — OFFICE VISIT (OUTPATIENT)
Dept: NEPHROLOGY | Facility: CLINIC | Age: 85
End: 2025-04-25
Payer: MEDICARE

## 2025-04-25 VITALS — SYSTOLIC BLOOD PRESSURE: 126 MMHG | OXYGEN SATURATION: 98 % | HEART RATE: 67 BPM | DIASTOLIC BLOOD PRESSURE: 80 MMHG

## 2025-04-25 DIAGNOSIS — I10 PRIMARY HYPERTENSION: ICD-10-CM

## 2025-04-25 DIAGNOSIS — N25.81 SECONDARY RENAL HYPERPARATHYROIDISM: ICD-10-CM

## 2025-04-25 DIAGNOSIS — N18.4 CKD (CHRONIC KIDNEY DISEASE) STAGE 4, GFR 15-29 ML/MIN: Primary | ICD-10-CM

## 2025-04-25 PROCEDURE — 99999 PR PBB SHADOW E&M-EST. PATIENT-LVL III: CPT | Mod: PBBFAC,,, | Performed by: INTERNAL MEDICINE

## 2025-04-25 RX ORDER — WARFARIN SODIUM 5 MG/1
5 TABLET ORAL
COMMUNITY

## 2025-04-25 NOTE — PROGRESS NOTES
Subjective:       Patient ID: Hernan Echavarria is a 84 y.o. Black or  male who presents for return patient evaluation for chronic renal failure.      He is not having as much gastroparesis.  He responded well to lasix and has no edema now.  He is feeling better with no chest pain.      Review of Systems   Constitutional:  Negative for chills, diaphoresis and fever.   Respiratory:  Negative for cough and shortness of breath.    Cardiovascular:  Negative for chest pain and leg swelling.   Gastrointestinal:  Negative for abdominal pain, diarrhea, nausea and vomiting.   Genitourinary:  Negative for difficulty urinating, dysuria and hematuria.   Musculoskeletal:  Negative for myalgias.   Neurological:  Negative for headaches.   Hematological:  Does not bruise/bleed easily.       The past medical, family and social histories were reviewed for this encounter.     /80 (BP Location: Right arm, Patient Position: Sitting)   Pulse 67   SpO2 98%     Objective:      Physical Exam  Vitals reviewed.   Constitutional:       General: He is not in acute distress.     Appearance: He is well-developed.   HENT:      Head: Normocephalic and atraumatic.   Eyes:      General: No scleral icterus.     Conjunctiva/sclera: Conjunctivae normal.   Neck:      Vascular: No JVD.   Cardiovascular:      Rate and Rhythm: Normal rate and regular rhythm.      Heart sounds: Normal heart sounds. No murmur heard.     No friction rub. No gallop.      Comments: Mechanical click  Pulmonary:      Effort: Pulmonary effort is normal. No respiratory distress.      Breath sounds: Normal breath sounds. No wheezing.   Abdominal:      General: Bowel sounds are normal. There is no distension.      Palpations: Abdomen is soft.      Tenderness: There is no abdominal tenderness.   Musculoskeletal:      Cervical back: Normal range of motion.      Right lower leg: No edema.      Left lower leg: No edema.   Skin:     General: Skin is warm and dry.      " Findings: No rash.   Neurological:      Mental Status: He is alert and oriented to person, place, and time.   Psychiatric:         Mood and Affect: Mood normal.         Behavior: Behavior normal.         Assessment:       1. CKD (chronic kidney disease) stage 4, GFR 15-29 ml/min    2. Primary hypertension    3. Secondary renal hyperparathyroidism        Lab Results   Component Value Date    CREATININE 2.10 (H) 04/09/2025    BUN 30 (H) 04/09/2025     04/09/2025    K 4.7 04/09/2025     04/09/2025    CO2 24 04/09/2025     Lab Results   Component Value Date    .8 (H) 04/09/2025    CALCIUM 9.2 04/09/2025    PHOS 3.1 04/09/2025     Lab Results   Component Value Date    HCT 40.6 02/25/2025     Prot/Creat Ratio, Urine   Date Value Ref Range Status   04/09/2025 0.2 0.0 - 0.2 Final   12/31/2024 0.1 0.0 - 0.2 mg/g Final   10/17/2024 264.6 (H) 15.0 - 68.0 mg/g Final     Plan:   Return to clinic in 3 months.  Labs for next visit include rp pth upc per standing orders q 3 months.    Baseline creatinine is 1.5-1.8 since 2015.  He has been more labile since he started having "gastroparesis" but this seems to have settled down now.  PTH is 183 with a calcium of 9.2.  Renal US shows R 10.4 cm L 10.8 cm.  UPC is negatiev.  His function has been labile given his combined CHF and pulmonary HTN.  Use lasix prn edema.  His target weight is 160 pounds.    KFRE 2-Year: 3% at 4/9/2025  8:39 AM  Calculated from:  Serum Creatinine: 2.1 mg/dL at 4/9/2025  8:39 AM  Urine Albumin Creatinine Ratio: 88.5 ug/mg at 9/25/2024 10:22 AM  Age: 84 years  Sex: Male at 4/9/2025  8:39 AM  Has CKD-3 to CKD-5: Yes    KFRE 5-Year: 8.9% at 4/9/2025  8:39 AM  Calculated from:  Serum Creatinine: 2.1 mg/dL at 4/9/2025  8:39 AM  Urine Albumin Creatinine Ratio: 88.5 ug/mg at 9/25/2024 10:22 AM  Age: 84 years  Sex: Male at 4/9/2025  8:39 AM  Has CKD-3 to CKD-5: Yes    "

## 2025-07-23 ENCOUNTER — OFFICE VISIT (OUTPATIENT)
Dept: NEPHROLOGY | Facility: CLINIC | Age: 85
End: 2025-07-23
Payer: MEDICARE

## 2025-07-23 VITALS
HEART RATE: 77 BPM | HEIGHT: 74 IN | OXYGEN SATURATION: 97 % | SYSTOLIC BLOOD PRESSURE: 112 MMHG | BODY MASS INDEX: 20.99 KG/M2 | DIASTOLIC BLOOD PRESSURE: 64 MMHG

## 2025-07-23 DIAGNOSIS — N25.81 SECONDARY RENAL HYPERPARATHYROIDISM: ICD-10-CM

## 2025-07-23 DIAGNOSIS — I10 PRIMARY HYPERTENSION: ICD-10-CM

## 2025-07-23 DIAGNOSIS — N18.4 CKD (CHRONIC KIDNEY DISEASE) STAGE 4, GFR 15-29 ML/MIN: Primary | ICD-10-CM

## 2025-07-23 PROCEDURE — 3074F SYST BP LT 130 MM HG: CPT | Mod: CPTII,S$GLB,, | Performed by: INTERNAL MEDICINE

## 2025-07-23 PROCEDURE — 1157F ADVNC CARE PLAN IN RCRD: CPT | Mod: CPTII,S$GLB,, | Performed by: INTERNAL MEDICINE

## 2025-07-23 PROCEDURE — 3078F DIAST BP <80 MM HG: CPT | Mod: CPTII,S$GLB,, | Performed by: INTERNAL MEDICINE

## 2025-07-23 PROCEDURE — 99999 PR PBB SHADOW E&M-EST. PATIENT-LVL III: CPT | Mod: PBBFAC,,, | Performed by: INTERNAL MEDICINE

## 2025-07-23 PROCEDURE — 1159F MED LIST DOCD IN RCRD: CPT | Mod: CPTII,S$GLB,, | Performed by: INTERNAL MEDICINE

## 2025-07-23 PROCEDURE — 99214 OFFICE O/P EST MOD 30 MIN: CPT | Mod: S$GLB,,, | Performed by: INTERNAL MEDICINE

## 2025-07-23 PROCEDURE — 1160F RVW MEDS BY RX/DR IN RCRD: CPT | Mod: CPTII,S$GLB,, | Performed by: INTERNAL MEDICINE

## 2025-07-23 NOTE — PROGRESS NOTES
Subjective:       Patient ID: Hernan Echavarria is a 84 y.o. Black or  male who presents for return patient evaluation for chronic renal failure.      He is not having as much gastroparesis.  He responded well to lasix and has no edema now.  He is feeling better with no chest pain.      Review of Systems   Constitutional:  Negative for chills, diaphoresis and fever.   Respiratory:  Negative for cough and shortness of breath.    Cardiovascular:  Negative for chest pain and leg swelling.   Gastrointestinal:  Negative for abdominal pain, diarrhea, nausea and vomiting.   Genitourinary:  Negative for difficulty urinating, dysuria and hematuria.   Musculoskeletal:  Negative for myalgias.   Neurological:  Negative for headaches.   Hematological:  Does not bruise/bleed easily.       The past medical, family and social histories were reviewed for this encounter.     There were no vitals taken for this visit.    Objective:      Physical Exam  Vitals reviewed.   Constitutional:       General: He is not in acute distress.     Appearance: He is well-developed.   HENT:      Head: Normocephalic and atraumatic.   Eyes:      General: No scleral icterus.     Conjunctiva/sclera: Conjunctivae normal.   Neck:      Vascular: No JVD.   Cardiovascular:      Rate and Rhythm: Normal rate and regular rhythm.      Heart sounds: Normal heart sounds. No murmur heard.     No friction rub. No gallop.      Comments: Mechanical click  Pulmonary:      Effort: Pulmonary effort is normal. No respiratory distress.      Breath sounds: Normal breath sounds. No wheezing.   Abdominal:      General: Bowel sounds are normal. There is no distension.      Palpations: Abdomen is soft.      Tenderness: There is no abdominal tenderness.   Musculoskeletal:      Cervical back: Normal range of motion.      Right lower leg: No edema.      Left lower leg: No edema.   Skin:     General: Skin is warm and dry.      Findings: No rash.   Neurological:       "Mental Status: He is alert and oriented to person, place, and time.   Psychiatric:         Mood and Affect: Mood normal.         Behavior: Behavior normal.         Assessment:       1. CKD (chronic kidney disease) stage 4, GFR 15-29 ml/min    2. Primary hypertension    3. Secondary renal hyperparathyroidism        Lab Results   Component Value Date    CREATININE 1.71 (H) 07/15/2025    BUN 26 (H) 07/15/2025     07/15/2025    K 5.1 07/15/2025     07/15/2025    CO2 24 07/15/2025     Lab Results   Component Value Date    PTH 98.1 (H) 07/15/2025    CALCIUM 9.4 07/15/2025    PHOS 3.8 07/15/2025     Lab Results   Component Value Date    HCT 42.1 06/10/2025     Prot/Creat Ratio, Urine   Date Value Ref Range Status   07/15/2025 0.5 (H) 0.0 - 0.2 Final   04/09/2025 0.2 0.0 - 0.2 Final   12/31/2024 0.1 0.0 - 0.2 mg/g Final     Plan:   Return to clinic in 6 months.  Labs for next visit include rp pth upc per standing orders q 3 months.    Baseline creatinine is 1.5-1.8 since 2015.  He has been more labile since he started having "gastroparesis" but this seems to have settled down now.  PTH is 98 with a calcium of 9.4.  Renal US shows R 10.4 cm L 10.8 cm.  UPC is negative.  His function has been labile given his combined CHF and pulmonary HTN.  Use lasix prn edema.  His target weight is 160 pounds.    KFRE 2-Year: 1.1% at 7/15/2025 10:09 AM  Calculated from:  Serum Creatinine: 1.71 mg/dL at 7/15/2025 10:09 AM  Urine Albumin Creatinine Ratio: 88.5 ug/mg at 9/25/2024 10:22 AM  Age: 84 years  Sex: Male at 7/15/2025 10:09 AM  Has CKD-3 to CKD-5: Yes    KFRE 5-Year: 3.4% at 7/15/2025 10:09 AM  Calculated from:  Serum Creatinine: 1.71 mg/dL at 7/15/2025 10:09 AM  Urine Albumin Creatinine Ratio: 88.5 ug/mg at 9/25/2024 10:22 AM  Age: 84 years  Sex: Male at 7/15/2025 10:09 AM  Has CKD-3 to CKD-5: Yes    "